# Patient Record
Sex: MALE | Race: WHITE | NOT HISPANIC OR LATINO | URBAN - METROPOLITAN AREA
[De-identification: names, ages, dates, MRNs, and addresses within clinical notes are randomized per-mention and may not be internally consistent; named-entity substitution may affect disease eponyms.]

---

## 2023-07-12 ENCOUNTER — TELEPHONE (OUTPATIENT)
Dept: PRIMARY CARE | Facility: CLINIC | Age: 68
End: 2023-07-12
Payer: MEDICARE

## 2023-07-12 DIAGNOSIS — K21.9 GASTROESOPHAGEAL REFLUX DISEASE WITHOUT ESOPHAGITIS: Primary | ICD-10-CM

## 2023-07-12 RX ORDER — OMEPRAZOLE 20 MG/1
20 CAPSULE, DELAYED RELEASE ORAL DAILY
Qty: 30 CAPSULE | Refills: 11 | Status: SHIPPED | OUTPATIENT
Start: 2023-07-12 | End: 2024-07-11

## 2023-07-12 NOTE — TELEPHONE ENCOUNTER
Patient called asking if you could send in an RX for Omeprozole 20mg he has been having a lot of acid reflux and got one a few off a friend and said they work great ,.

## 2023-07-31 ASSESSMENT — PROMIS GLOBAL HEALTH SCALE
RATE_GENERAL_HEALTH: VERY GOOD
CARRYOUT_SOCIAL_ACTIVITIES: VERY GOOD
RATE_MENTAL_HEALTH: VERY GOOD
CARRYOUT_PHYSICAL_ACTIVITIES: COMPLETELY
RATE_SOCIAL_SATISFACTION: VERY GOOD
EMOTIONAL_PROBLEMS: NEVER
RATE_AVERAGE_PAIN: 3
RATE_AVERAGE_FATIGUE: MILD
RATE_QUALITY_OF_LIFE: VERY GOOD
RATE_PHYSICAL_HEALTH: VERY GOOD

## 2023-08-02 ENCOUNTER — OFFICE VISIT (OUTPATIENT)
Dept: PRIMARY CARE | Facility: CLINIC | Age: 68
End: 2023-08-02
Payer: MEDICARE

## 2023-08-02 VITALS
WEIGHT: 180 LBS | RESPIRATION RATE: 16 BRPM | SYSTOLIC BLOOD PRESSURE: 150 MMHG | DIASTOLIC BLOOD PRESSURE: 80 MMHG | HEART RATE: 80 BPM | OXYGEN SATURATION: 97 % | HEIGHT: 72 IN | BODY MASS INDEX: 24.38 KG/M2 | TEMPERATURE: 98.2 F

## 2023-08-02 DIAGNOSIS — E78.00 HYPERCHOLESTEROLEMIA: ICD-10-CM

## 2023-08-02 DIAGNOSIS — M25.562 CHRONIC PAIN OF LEFT KNEE: Primary | ICD-10-CM

## 2023-08-02 DIAGNOSIS — Z87.891 FORMER SMOKER: ICD-10-CM

## 2023-08-02 DIAGNOSIS — G89.29 CHRONIC PAIN OF LEFT KNEE: Primary | ICD-10-CM

## 2023-08-02 DIAGNOSIS — Z79.899 CURRENT USE OF PROTON PUMP INHIBITOR: ICD-10-CM

## 2023-08-02 DIAGNOSIS — Z00.00 MEDICARE ANNUAL WELLNESS VISIT, INITIAL: ICD-10-CM

## 2023-08-02 DIAGNOSIS — Z12.5 SCREENING PSA (PROSTATE SPECIFIC ANTIGEN): ICD-10-CM

## 2023-08-02 DIAGNOSIS — I10 PRIMARY HYPERTENSION: ICD-10-CM

## 2023-08-02 PROBLEM — R94.31 ABNORMAL EKG: Status: RESOLVED | Noted: 2023-08-02 | Resolved: 2023-08-02

## 2023-08-02 PROBLEM — K21.9 GERD (GASTROESOPHAGEAL REFLUX DISEASE): Status: ACTIVE | Noted: 2023-08-02

## 2023-08-02 PROBLEM — D64.9 ANEMIA: Status: RESOLVED | Noted: 2023-08-02 | Resolved: 2023-08-02

## 2023-08-02 PROBLEM — M20.021 BOUTONNIERE DEFORMITY OF FINGER OF RIGHT HAND: Status: RESOLVED | Noted: 2023-08-02 | Resolved: 2023-08-02

## 2023-08-02 LAB
ALANINE AMINOTRANSFERASE (SGPT) (U/L) IN SER/PLAS: 95 U/L (ref 10–52)
ALBUMIN (G/DL) IN SER/PLAS: 4.4 G/DL (ref 3.4–5)
ALKALINE PHOSPHATASE (U/L) IN SER/PLAS: 47 U/L (ref 33–136)
ANION GAP IN SER/PLAS: 13 MMOL/L (ref 10–20)
ASPARTATE AMINOTRANSFERASE (SGOT) (U/L) IN SER/PLAS: 48 U/L (ref 9–39)
BILIRUBIN TOTAL (MG/DL) IN SER/PLAS: 0.8 MG/DL (ref 0–1.2)
CALCIUM (MG/DL) IN SER/PLAS: 9.6 MG/DL (ref 8.6–10.3)
CARBON DIOXIDE, TOTAL (MMOL/L) IN SER/PLAS: 28 MMOL/L (ref 21–32)
CHLORIDE (MMOL/L) IN SER/PLAS: 102 MMOL/L (ref 98–107)
CHOLESTEROL (MG/DL) IN SER/PLAS: 248 MG/DL (ref 0–199)
CHOLESTEROL IN HDL (MG/DL) IN SER/PLAS: 55.1 MG/DL
CHOLESTEROL/HDL RATIO: 4.5
CREATININE (MG/DL) IN SER/PLAS: 1.2 MG/DL (ref 0.5–1.3)
ERYTHROCYTE DISTRIBUTION WIDTH (RATIO) BY AUTOMATED COUNT: 12.8 % (ref 11.5–14.5)
ERYTHROCYTE MEAN CORPUSCULAR HEMOGLOBIN CONCENTRATION (G/DL) BY AUTOMATED: 32.8 G/DL (ref 32–36)
ERYTHROCYTE MEAN CORPUSCULAR VOLUME (FL) BY AUTOMATED COUNT: 91 FL (ref 80–100)
ERYTHROCYTES (10*6/UL) IN BLOOD BY AUTOMATED COUNT: 5.82 X10E12/L (ref 4.5–5.9)
GFR MALE: 66 ML/MIN/1.73M2
GLUCOSE (MG/DL) IN SER/PLAS: 103 MG/DL (ref 74–99)
HEMATOCRIT (%) IN BLOOD BY AUTOMATED COUNT: 52.7 % (ref 41–52)
HEMOGLOBIN (G/DL) IN BLOOD: 17.3 G/DL (ref 13.5–17.5)
LDL: 160 MG/DL (ref 0–99)
LEUKOCYTES (10*3/UL) IN BLOOD BY AUTOMATED COUNT: 5.3 X10E9/L (ref 4.4–11.3)
MAGNESIUM (MG/DL) IN SER/PLAS: 2.31 MG/DL (ref 1.6–2.4)
PLATELETS (10*3/UL) IN BLOOD AUTOMATED COUNT: 227 X10E9/L (ref 150–450)
POTASSIUM (MMOL/L) IN SER/PLAS: 4.9 MMOL/L (ref 3.5–5.3)
PROTEIN TOTAL: 6.6 G/DL (ref 6.4–8.2)
SODIUM (MMOL/L) IN SER/PLAS: 138 MMOL/L (ref 136–145)
TRIGLYCERIDE (MG/DL) IN SER/PLAS: 166 MG/DL (ref 0–149)
UREA NITROGEN (MG/DL) IN SER/PLAS: 14 MG/DL (ref 6–23)
VLDL: 33 MG/DL (ref 0–40)

## 2023-08-02 PROCEDURE — 3079F DIAST BP 80-89 MM HG: CPT | Performed by: FAMILY MEDICINE

## 2023-08-02 PROCEDURE — 1159F MED LIST DOCD IN RCRD: CPT | Performed by: FAMILY MEDICINE

## 2023-08-02 PROCEDURE — 3077F SYST BP >= 140 MM HG: CPT | Performed by: FAMILY MEDICINE

## 2023-08-02 PROCEDURE — 83735 ASSAY OF MAGNESIUM: CPT

## 2023-08-02 PROCEDURE — 85027 COMPLETE CBC AUTOMATED: CPT

## 2023-08-02 PROCEDURE — 80053 COMPREHEN METABOLIC PANEL: CPT

## 2023-08-02 PROCEDURE — 80061 LIPID PANEL: CPT

## 2023-08-02 PROCEDURE — G0103 PSA SCREENING: HCPCS

## 2023-08-02 PROCEDURE — 99214 OFFICE O/P EST MOD 30 MIN: CPT | Performed by: FAMILY MEDICINE

## 2023-08-02 PROCEDURE — 1160F RVW MEDS BY RX/DR IN RCRD: CPT | Performed by: FAMILY MEDICINE

## 2023-08-02 PROCEDURE — 1170F FXNL STATUS ASSESSED: CPT | Performed by: FAMILY MEDICINE

## 2023-08-02 PROCEDURE — 1036F TOBACCO NON-USER: CPT | Performed by: FAMILY MEDICINE

## 2023-08-02 PROCEDURE — 3008F BODY MASS INDEX DOCD: CPT | Performed by: FAMILY MEDICINE

## 2023-08-02 PROCEDURE — G0446 INTENS BEHAVE THER CARDIO DX: HCPCS | Performed by: FAMILY MEDICINE

## 2023-08-02 PROCEDURE — G0438 PPPS, INITIAL VISIT: HCPCS | Performed by: FAMILY MEDICINE

## 2023-08-02 RX ORDER — MELOXICAM 7.5 MG/1
1 TABLET ORAL DAILY
COMMUNITY
Start: 2022-08-05 | End: 2023-08-02

## 2023-08-02 RX ORDER — AMLODIPINE BESYLATE 10 MG/1
1 TABLET ORAL DAILY
COMMUNITY
Start: 2018-05-15 | End: 2023-11-13

## 2023-08-02 RX ORDER — MULTIVITAMIN
1 TABLET ORAL DAILY
COMMUNITY

## 2023-08-02 RX ORDER — ATORVASTATIN CALCIUM 40 MG/1
40 TABLET, FILM COATED ORAL DAILY
COMMUNITY
Start: 2016-04-22 | End: 2023-08-04 | Stop reason: ALTCHOICE

## 2023-08-02 RX ORDER — ASPIRIN 81 MG/1
81 TABLET ORAL DAILY
Qty: 30 TABLET | Refills: 11
Start: 2023-08-02 | End: 2024-08-01

## 2023-08-02 ASSESSMENT — ACTIVITIES OF DAILY LIVING (ADL)
GROCERY_SHOPPING: INDEPENDENT
DOING_HOUSEWORK: INDEPENDENT
BATHING: INDEPENDENT
DRESSING: INDEPENDENT
MANAGING_FINANCES: INDEPENDENT
TAKING_MEDICATION: INDEPENDENT

## 2023-08-02 ASSESSMENT — ENCOUNTER SYMPTOMS
LOSS OF SENSATION IN FEET: 0
DEPRESSION: 0
OCCASIONAL FEELINGS OF UNSTEADINESS: 0

## 2023-08-02 ASSESSMENT — PATIENT HEALTH QUESTIONNAIRE - PHQ9
1. LITTLE INTEREST OR PLEASURE IN DOING THINGS: NOT AT ALL
2. FEELING DOWN, DEPRESSED OR HOPELESS: NOT AT ALL
SUM OF ALL RESPONSES TO PHQ9 QUESTIONS 1 AND 2: 0

## 2023-08-02 NOTE — ASSESSMENT & PLAN NOTE
Stable on amlodipine to10 mg daily.   Continue healthy diet and regular exercise.  Well controlled at [place] blood donation.   To try cutting back on meloxicam to see if this helps bring it down.     Taking low dose aspirin to lower ASCVD risk.

## 2023-08-02 NOTE — PROGRESS NOTES
Subjective   Reason for Visit: Fer Kasper is an 68 y.o. male here for a Medicare Wellness visit.  And recheck on HTN,. High Cholesterol, and GERD.     Past Medical, Surgical, and Family History reviewed and updated in chart.    Reviewed all medications by prescribing practitioner or clinical pharmacist (such as prescriptions, OTCs, herbal therapies and supplements) and documented in the medical record.        Patient Care Team:  Watson Carmichael MD as PCP - General   Sees ORTHO Dr Agustin with [facility].         Objective   Vitals:  /80 (BP Location: Right arm, Patient Position: Sitting, BP Cuff Size: Adult)   Pulse 80   Temp 36.8 °C (98.2 °F) (Temporal)   Resp 16   Ht 1.829 m (6')   Wt 81.6 kg (180 lb)   SpO2 97%   BMI 24.41 kg/m²       Physical Exam  Vitals reviewed.   Constitutional:       General: He is not in acute distress.     Appearance: He is not ill-appearing.   HENT:      Head: Normocephalic and atraumatic.      Right Ear: Tympanic membrane normal.      Left Ear: Tympanic membrane normal.      Nose: No congestion or rhinorrhea.      Mouth/Throat:      Pharynx: No oropharyngeal exudate or posterior oropharyngeal erythema.   Eyes:      Extraocular Movements: Extraocular movements intact.      Conjunctiva/sclera: Conjunctivae normal.      Pupils: Pupils are equal, round, and reactive to light.   Cardiovascular:      Rate and Rhythm: Normal rate and regular rhythm.      Heart sounds: No murmur heard.     No friction rub. No gallop.   Pulmonary:      Effort: Pulmonary effort is normal.      Breath sounds: Normal breath sounds. No wheezing, rhonchi or rales.   Abdominal:      General: There is no distension.      Palpations: Abdomen is soft.      Tenderness: There is no abdominal tenderness. There is no guarding or rebound.   Musculoskeletal:         General: No swelling or deformity.      Cervical back: Normal range of motion and neck supple.      Right lower leg: No edema.      Left lower leg:  No edema.   Skin:     Capillary Refill: Capillary refill takes less than 2 seconds.      Coloration: Skin is not jaundiced.      Findings: No rash.   Neurological:      General: No focal deficit present.      Mental Status: He is alert.      Motor: No weakness.   Psychiatric:         Mood and Affect: Mood normal.         Behavior: Behavior normal.     Tight hamstrings.  No knee instability.     Assessment/Plan   Problem List Items Addressed This Visit       Chronic pain of left knee - Primary    Overview     S/p CSI (has not tried gel) injections but no longer working. (Ortho Agustin)  9/2023 PLANNING LEFT TKA (AGUSTIN).          Current Assessment & Plan     Also noting right knee giving out on him.  -- suspect hamstring tendonitis/bursitis -- stretching handout provided 8/2/23         Hypercholesterolemia    Overview     excellent control on atorvastatin 40 mg daily.          Current Assessment & Plan     Monitor labs. Continue atorvastatin.          Hypertension    Overview     NO lisinopril due to elevated cr.   Normal renal us 2018.            Current Assessment & Plan     Stable on amlodipine to10 mg daily.   Continue healthy diet and regular exercise.  Well controlled at [place] blood donation.   To try cutting back on meloxicam to see if this helps bring it down.     Taking low dose aspirin to lower ASCVD risk.          Medicare annual wellness visit, initial    Overview     8/2/23 Initial MEDICARE Wellness EVAL -- former smoker -- no current tobacco (quit > 30 y ago), no dementia, no depression. Colonoscopy clean 2014 (next due 2024). Tdap 2018. (next booster due 2028). Encouraged healthy diet and regular exercise. Works at [employer]. Moderate alcohol intake (2-3 glasses wine per night, more on weekend) -- will follow liver function. Pneumovax 8/12/2020. Vaccines up to date -- Prevnar 20 8/5/2022.  Last covid booster 10/11/2022.     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     I spent 15 minutes face-to-face with this individual discussing their cardiovascular risk and behavioral therapies of nutritional choices, exercise, and elimination of habits contributing to risk.  We agreed on a plan addressing reduction of their current cardiovascular risk.  Patient's 10 year CV risk estimate calculates to:   18% ()   4/28/14 Colonoscopy and EGD per Abbvaishali      # Hx Lumbar spondylosis/herniated disc -- improved with core strenghthening per DR Marie (PAIN 12/9/2020). Never needed prednisone burst.      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   # hx Abnormal EKG -- normal stress testing 4/26/2016. New LAFB with LAD 2022. Asymptomatic.   # Hx Chalazion -- left eyelid -- resolved.              Other Visit Diagnoses       BMI 24.0-24.9, adult        Former smoker

## 2023-08-02 NOTE — ASSESSMENT & PLAN NOTE
Also noting right knee giving out on him.  -- suspect hamstring tendonitis/bursitis -- stretching handout provided 8/2/23

## 2023-08-02 NOTE — ASSESSMENT & PLAN NOTE
>>ASSESSMENT AND PLAN FOR CHRONIC PAIN OF LEFT KNEE WRITTEN ON 8/2/2023  9:32 AM BY HERMILO BERTRAND MD    Also noting right knee giving out on him.  -- suspect hamstring tendonitis/bursitis -- stretching handout provided 8/2/23

## 2023-08-03 LAB — PROSTATE SPECIFIC AG (NG/ML) IN SER/PLAS: 3.84 NG/ML (ref 0–4)

## 2023-08-04 DIAGNOSIS — E78.00 HYPERCHOLESTEROLEMIA: Primary | ICD-10-CM

## 2023-08-04 DIAGNOSIS — E78.00 HYPERCHOLESTEROLEMIA: ICD-10-CM

## 2023-08-04 RX ORDER — ROSUVASTATIN CALCIUM 20 MG/1
20 TABLET, COATED ORAL DAILY
Qty: 90 TABLET | Refills: 3 | Status: SHIPPED
Start: 2023-08-04 | End: 2023-11-13 | Stop reason: ALTCHOICE

## 2023-08-04 RX ORDER — ROSUVASTATIN CALCIUM 20 MG/1
20 TABLET, COATED ORAL DAILY
Qty: 90 TABLET | Refills: 3 | Status: SHIPPED | OUTPATIENT
Start: 2023-08-04 | End: 2024-08-03

## 2023-10-02 ENCOUNTER — HOSPITAL ENCOUNTER (OUTPATIENT)
Facility: HOSPITAL | Age: 68
Setting detail: OUTPATIENT SURGERY
End: 2023-10-02
Attending: ORTHOPAEDIC SURGERY | Admitting: ORTHOPAEDIC SURGERY
Payer: MEDICARE

## 2023-11-10 ENCOUNTER — APPOINTMENT (OUTPATIENT)
Dept: PRIMARY CARE | Facility: CLINIC | Age: 68
End: 2023-11-10
Payer: MEDICARE

## 2023-11-11 DIAGNOSIS — I10 PRIMARY HYPERTENSION: Primary | ICD-10-CM

## 2023-11-13 RX ORDER — MELOXICAM 7.5 MG/1
TABLET ORAL
COMMUNITY
Start: 2023-08-17 | End: 2023-11-14 | Stop reason: ALTCHOICE

## 2023-11-13 RX ORDER — AMLODIPINE BESYLATE 10 MG/1
10 TABLET ORAL DAILY
Qty: 90 TABLET | Refills: 3 | Status: SHIPPED | OUTPATIENT
Start: 2023-11-13

## 2023-11-14 ENCOUNTER — OFFICE VISIT (OUTPATIENT)
Dept: PRIMARY CARE | Facility: CLINIC | Age: 68
End: 2023-11-14
Payer: MEDICARE

## 2023-11-14 VITALS
DIASTOLIC BLOOD PRESSURE: 80 MMHG | HEART RATE: 102 BPM | RESPIRATION RATE: 16 BRPM | WEIGHT: 185.2 LBS | HEIGHT: 72 IN | TEMPERATURE: 97.8 F | OXYGEN SATURATION: 98 % | BODY MASS INDEX: 25.09 KG/M2 | SYSTOLIC BLOOD PRESSURE: 140 MMHG

## 2023-11-14 DIAGNOSIS — K21.9 GASTROESOPHAGEAL REFLUX DISEASE WITHOUT ESOPHAGITIS: ICD-10-CM

## 2023-11-14 DIAGNOSIS — G89.29 CHRONIC PAIN OF LEFT KNEE: Primary | ICD-10-CM

## 2023-11-14 DIAGNOSIS — E66.3 OVERWEIGHT WITH BODY MASS INDEX (BMI) OF 25 TO 25.9 IN ADULT: ICD-10-CM

## 2023-11-14 DIAGNOSIS — Z87.891 FORMER SMOKER: ICD-10-CM

## 2023-11-14 DIAGNOSIS — M25.562 CHRONIC PAIN OF LEFT KNEE: Primary | ICD-10-CM

## 2023-11-14 DIAGNOSIS — E78.00 HYPERCHOLESTEROLEMIA: ICD-10-CM

## 2023-11-14 DIAGNOSIS — I10 PRIMARY HYPERTENSION: ICD-10-CM

## 2023-11-14 PROCEDURE — 3008F BODY MASS INDEX DOCD: CPT | Performed by: FAMILY MEDICINE

## 2023-11-14 PROCEDURE — 99214 OFFICE O/P EST MOD 30 MIN: CPT | Performed by: FAMILY MEDICINE

## 2023-11-14 PROCEDURE — 3077F SYST BP >= 140 MM HG: CPT | Performed by: FAMILY MEDICINE

## 2023-11-14 PROCEDURE — 1159F MED LIST DOCD IN RCRD: CPT | Performed by: FAMILY MEDICINE

## 2023-11-14 PROCEDURE — 1160F RVW MEDS BY RX/DR IN RCRD: CPT | Performed by: FAMILY MEDICINE

## 2023-11-14 PROCEDURE — 1036F TOBACCO NON-USER: CPT | Performed by: FAMILY MEDICINE

## 2023-11-14 PROCEDURE — 3079F DIAST BP 80-89 MM HG: CPT | Performed by: FAMILY MEDICINE

## 2023-11-14 ASSESSMENT — ENCOUNTER SYMPTOMS
SHORTNESS OF BREATH: 0
ARTHRALGIAS: 1
COUGH: 0
HEMATURIA: 0
BRUISES/BLEEDS EASILY: 0
HALLUCINATIONS: 0
SORE THROAT: 0
POLYDIPSIA: 0
BACK PAIN: 0
WOUND: 0
DYSURIA: 0
HEADACHES: 0
CHILLS: 0
FATIGUE: 0
EYE REDNESS: 0
BLOOD IN STOOL: 0
NECK STIFFNESS: 0
FEVER: 0
RHINORRHEA: 0
PALPITATIONS: 0
EYE PAIN: 0
ABDOMINAL PAIN: 0
ADENOPATHY: 0
WEAKNESS: 0

## 2023-11-14 NOTE — ASSESSMENT & PLAN NOTE
Stable on amlodipine to10 mg daily.     Continue current medications, healthy diet and regular exercise.    Well controlled at [place] blood donation.     Should take amlodipine on day of surgery.  OK to hold low dose aspirin for 1 week prior to surgery.  Then restart the day after.    Taking low dose aspirin to lower ASCVD risk.

## 2023-11-14 NOTE — ASSESSMENT & PLAN NOTE
>>ASSESSMENT AND PLAN FOR CHRONIC PAIN OF LEFT KNEE WRITTEN ON 11/14/2023  1:26 PM BY HERMILO BERTRAND MD    CLEAR FOR UPCOMING SURGERY 12/11/23  Low cardiac risk for upcoming moderate risk orthopedic surgery.   STOP ASPIRIN for 1 week prior to surgery. Restart the day after.   Take BP medicine amlodipine on day of surgery.     Best of [place]!!!

## 2023-11-14 NOTE — PROGRESS NOTES
Fer Kasper is a 68 y.o. male with Chief Complaint of Pre-op Exam (LT total knee replacement ).    Seen today for comprensive evaluation of chronic HTN, HLD, GERD prior to undergoing TKA for end stage OA that has not responded to conservative measures.     No cp/sob/n/v/d.     Past Surgical History:   Procedure Laterality Date    COLONOSCOPY  04/29/2015    Complete Colonoscopy    ESOPHAGOGASTRODUODENOSCOPY  04/29/2015    Diagnostic Esophagogastroduodenoscopy      Social History     Socioeconomic History    Marital status:      Spouse name: Not on file    Number of children: Not on file    Years of education: Not on file    Highest education level: Not on file   Occupational History    Not on file   Tobacco Use    Smoking status: Former     Types: Cigarettes     Passive exposure: Past    Smokeless tobacco: Never   Vaping Use    Vaping Use: Never used   Substance and Sexual Activity    Alcohol use: Yes     Alcohol/week: 6.0 standard drinks of alcohol     Types: 6 Glasses of wine per week    Drug use: Never    Sexual activity: Yes     Partners: Female   Other Topics Concern    Not on file   Social History Narrative    Not on file     Social Determinants of Health     Financial Resource Strain: Not on file   Food Insecurity: Not on file   Transportation Needs: Not on file   Physical Activity: Not on file   Stress: Not on file   Social Connections: Not on file   Intimate Partner Violence: Not on file   Housing Stability: Not on file     Past Medical History:   Diagnosis Date    Abnormal EKG 08/02/2023    Anemia 08/02/2023    Boutonniere deformity of finger of right hand 08/02/2023    Brachial neuritis or radiculitis 12/09/2010    Cervicalgia 12/09/2010    Other long term (current) drug therapy 08/10/2016    Current use of proton pump inhibitor    Other long term (current) drug therapy 08/10/2016    Use of proton pump inhibitor therapy    Pain in joint, shoulder region 12/09/2010    Personal history of other  specified conditions 02/27/2014    History of abdominal pain      No family history on file.     Review of Systems   Constitutional:  Negative for chills, fatigue and fever.   HENT:  Negative for rhinorrhea and sore throat.    Eyes:  Negative for pain and redness.   Respiratory:  Negative for cough and shortness of breath.    Cardiovascular:  Negative for chest pain and palpitations.   Gastrointestinal:  Negative for abdominal pain and blood in stool.   Endocrine: Negative for polydipsia and polyuria.   Genitourinary:  Negative for dysuria and hematuria.   Musculoskeletal:  Positive for arthralgias. Negative for back pain and neck stiffness.   Skin:  Negative for rash and wound.   Allergic/Immunologic: Negative for environmental allergies and food allergies.   Neurological:  Negative for weakness and headaches.   Hematological:  Negative for adenopathy. Does not bruise/bleed easily.   Psychiatric/Behavioral:  Negative for hallucinations and suicidal ideas.       /80 (BP Location: Right arm, Patient Position: Sitting, BP Cuff Size: Adult)   Pulse 102   Temp 36.6 °C (97.8 °F) (Temporal)   Resp 16   Ht 1.829 m (6')   Wt 84 kg (185 lb 3.2 oz)   SpO2 98%   BMI 25.12 kg/m²   Physical Exam  Vitals reviewed.   Constitutional:       General: He is not in acute distress.     Appearance: He is not ill-appearing.   HENT:      Head: Normocephalic and atraumatic.      Right Ear: Tympanic membrane normal.      Left Ear: Tympanic membrane normal.      Nose: No congestion or rhinorrhea.      Mouth/Throat:      Pharynx: No oropharyngeal exudate or posterior oropharyngeal erythema.   Eyes:      Extraocular Movements: Extraocular movements intact.      Conjunctiva/sclera: Conjunctivae normal.      Pupils: Pupils are equal, round, and reactive to light.   Cardiovascular:      Rate and Rhythm: Normal rate and regular rhythm.      Heart sounds: No murmur heard.     No friction rub. No gallop.   Pulmonary:      Effort:  "Pulmonary effort is normal.      Breath sounds: Normal breath sounds. No wheezing, rhonchi or rales.   Abdominal:      General: There is no distension.      Palpations: Abdomen is soft.      Tenderness: There is no abdominal tenderness. There is no guarding or rebound.   Musculoskeletal:         General: No swelling or deformity.      Cervical back: Normal range of motion and neck supple.      Right lower leg: No edema.      Left lower leg: No edema.   Skin:     Capillary Refill: Capillary refill takes less than 2 seconds.      Coloration: Skin is not jaundiced.      Findings: No rash.   Neurological:      General: No focal deficit present.      Mental Status: He is alert.      Motor: No weakness.   Psychiatric:         Mood and Affect: Mood normal.         Behavior: Behavior normal.       Lab Results   Component Value Date    WBC 5.3 08/02/2023    HGB 17.3 08/02/2023    HCT 52.7 (H) 08/02/2023    MCV 91 08/02/2023     08/02/2023     Lab Results   Component Value Date    CHOL 248 (H) 08/02/2023    CHOL 186 08/05/2022    CHOL 177 08/20/2021     Lab Results   Component Value Date    HDL 55.1 08/02/2023    HDL 78.8 08/05/2022    HDL 91.9 08/20/2021     No results found for: \"LDLCALC\"  Lab Results   Component Value Date    TRIG 166 (H) 08/02/2023    TRIG 39 08/05/2022    TRIG 28 (LL) 08/20/2021     No components found for: \"CHOLHDL\"  No results found for: \"HGBA1C\"    Assessment/Plan   Problem List Items Addressed This Visit       Chronic pain of left knee - Primary    Overview     S/p CSI (has not tried gel) injections but no longer working. (Ortho Agustin)    12/11/23 PLANNING LEFT TKA (AGUSTIN).          Current Assessment & Plan     CLEAR FOR UPCOMING SURGERY 12/11/23  Low cardiac risk for upcoming moderate risk orthopedic surgery.   STOP ASPIRIN for 1 week prior to surgery. Restart the day after.   Take BP medicine amlodipine on day of surgery.     [facility] of [place]!!!         GERD (gastroesophageal reflux disease)    " Overview     EGD 4/28/14 -- gastric erythema.  Restarted PPI -- omeprazole 20 mg daily.   under good control with apple cider vinegar capsules.         Current Assessment & Plan     Continue omeprazole to good effect.   Mg level normal.          Hypercholesterolemia    Overview     8/4/23 Changed atorva 40 to rosuva 20 mg.          Current Assessment & Plan     Continue atorvastatin.          Hypertension    Overview     NO lisinopril/ACE/ARB due to elevated cr.     Normal renal US 2018.            Current Assessment & Plan     Stable on amlodipine to10 mg daily.     Continue current medications, healthy diet and regular exercise.    Well controlled at [place] blood donation.     Should take amlodipine on day of surgery.  OK to hold low dose aspirin for 1 week prior to surgery.  Then restart the day after.    Taking low dose aspirin to lower ASCVD risk.           Other Visit Diagnoses       Overweight with body mass index (BMI) of 25 to 25.9 in adult        Former smoker

## 2023-11-14 NOTE — ASSESSMENT & PLAN NOTE
CLEAR FOR UPCOMING SURGERY 12/11/23  Low cardiac risk for upcoming moderate risk orthopedic surgery.   STOP ASPIRIN for 1 week prior to surgery. Restart the day after.   Take BP medicine amlodipine on day of surgery.     Best of [place]!!!

## 2023-12-01 ENCOUNTER — PRE-ADMISSION TESTING (OUTPATIENT)
Dept: PREADMISSION TESTING | Facility: HOSPITAL | Age: 68
End: 2023-12-01
Payer: MEDICARE

## 2023-12-01 ENCOUNTER — LAB (OUTPATIENT)
Dept: LAB | Facility: LAB | Age: 68
End: 2023-12-01
Payer: MEDICARE

## 2023-12-01 VITALS
HEIGHT: 72 IN | TEMPERATURE: 96.8 F | SYSTOLIC BLOOD PRESSURE: 155 MMHG | OXYGEN SATURATION: 97 % | HEART RATE: 116 BPM | WEIGHT: 182.54 LBS | DIASTOLIC BLOOD PRESSURE: 81 MMHG | BODY MASS INDEX: 24.72 KG/M2

## 2023-12-01 DIAGNOSIS — Z01.818 PREOPERATIVE TESTING: ICD-10-CM

## 2023-12-01 DIAGNOSIS — Z01.818 PREOPERATIVE TESTING: Primary | ICD-10-CM

## 2023-12-01 LAB
ALBUMIN SERPL-MCNC: 4.6 G/DL (ref 3.5–5)
ALP BLD-CCNC: 74 U/L (ref 35–125)
ALT SERPL-CCNC: 28 U/L (ref 5–40)
ANION GAP SERPL CALC-SCNC: 16 MMOL/L
APPEARANCE UR: ABNORMAL
AST SERPL-CCNC: 43 U/L (ref 5–40)
BILIRUB SERPL-MCNC: 0.9 MG/DL (ref 0.1–1.2)
BILIRUB UR STRIP.AUTO-MCNC: NEGATIVE MG/DL
BUN SERPL-MCNC: 27 MG/DL (ref 8–25)
CALCIUM SERPL-MCNC: 9.5 MG/DL (ref 8.5–10.4)
CHLORIDE SERPL-SCNC: 99 MMOL/L (ref 97–107)
CO2 SERPL-SCNC: 22 MMOL/L (ref 24–31)
COLOR UR: YELLOW
CREAT SERPL-MCNC: 1.4 MG/DL (ref 0.4–1.6)
ERYTHROCYTE [DISTWIDTH] IN BLOOD BY AUTOMATED COUNT: 12.3 % (ref 11.5–14.5)
GFR SERPL CREATININE-BSD FRML MDRD: 55 ML/MIN/1.73M*2
GLUCOSE SERPL-MCNC: 93 MG/DL (ref 65–99)
GLUCOSE UR STRIP.AUTO-MCNC: NORMAL MG/DL
HCT VFR BLD AUTO: 42.2 % (ref 41–52)
HGB BLD-MCNC: 14 G/DL (ref 13.5–17.5)
KETONES UR STRIP.AUTO-MCNC: ABNORMAL MG/DL
LEUKOCYTE ESTERASE UR QL STRIP.AUTO: NEGATIVE
MCH RBC QN AUTO: 31.2 PG (ref 26–34)
MCHC RBC AUTO-ENTMCNC: 33.2 G/DL (ref 32–36)
MCV RBC AUTO: 94 FL (ref 80–100)
MUCOUS THREADS #/AREA URNS AUTO: NORMAL /LPF
NITRITE UR QL STRIP.AUTO: NEGATIVE
NRBC BLD-RTO: 0 /100 WBCS (ref 0–0)
PH UR STRIP.AUTO: 5.5 [PH]
PLATELET # BLD AUTO: 241 X10*3/UL (ref 150–450)
POTASSIUM SERPL-SCNC: 5.1 MMOL/L (ref 3.4–5.1)
PROT SERPL-MCNC: 7.4 G/DL (ref 5.9–7.9)
PROT UR STRIP.AUTO-MCNC: ABNORMAL MG/DL
RBC # BLD AUTO: 4.49 X10*6/UL (ref 4.5–5.9)
RBC # UR STRIP.AUTO: NEGATIVE /UL
RBC #/AREA URNS AUTO: NORMAL /HPF
SODIUM SERPL-SCNC: 137 MMOL/L (ref 133–145)
SP GR UR STRIP.AUTO: 1.03
UROBILINOGEN UR STRIP.AUTO-MCNC: NORMAL MG/DL
WBC # BLD AUTO: 4.7 X10*3/UL (ref 4.4–11.3)
WBC #/AREA URNS AUTO: NORMAL /HPF

## 2023-12-01 PROCEDURE — 36415 COLL VENOUS BLD VENIPUNCTURE: CPT

## 2023-12-01 PROCEDURE — 81001 URINALYSIS AUTO W/SCOPE: CPT

## 2023-12-01 PROCEDURE — 93005 ELECTROCARDIOGRAM TRACING: CPT

## 2023-12-01 PROCEDURE — 85027 COMPLETE CBC AUTOMATED: CPT

## 2023-12-01 PROCEDURE — 93010 ELECTROCARDIOGRAM REPORT: CPT | Performed by: INTERNAL MEDICINE

## 2023-12-01 PROCEDURE — 87081 CULTURE SCREEN ONLY: CPT | Mod: WESLAB

## 2023-12-01 PROCEDURE — 80053 COMPREHEN METABOLIC PANEL: CPT

## 2023-12-01 RX ORDER — IBUPROFEN 200 MG
400 TABLET ORAL EVERY 6 HOURS PRN
COMMUNITY
End: 2023-12-20 | Stop reason: ALTCHOICE

## 2023-12-01 RX ORDER — BISMUTH SUBSALICYLATE 262 MG/1
524 TABLET ORAL DAILY PRN
COMMUNITY
End: 2023-12-20 | Stop reason: ALTCHOICE

## 2023-12-01 RX ORDER — CHLORHEXIDINE GLUCONATE ORAL RINSE 1.2 MG/ML
15 SOLUTION DENTAL DAILY
Qty: 30 ML | Refills: 0 | Status: SHIPPED | OUTPATIENT
Start: 2023-12-01 | End: 2023-12-03

## 2023-12-01 ASSESSMENT — DUKE ACTIVITY SCORE INDEX (DASI)
CAN YOU DO MODERATE WORK AROUND THE HOUSE LIKE VACUUMING, SWEEPING FLOORS OR CARRYING GROCERIES: YES
CAN YOU DO YARD WORK LIKE RAKING LEAVES, WEEDING OR PUSHING A MOWER: YES
DASI METS SCORE: 9
CAN YOU TAKE CARE OF YOURSELF (EAT, DRESS, BATHE, OR USE TOILET): YES
CAN YOU PARTICIPATE IN MODERATE RECREATIONAL ACTIVITIES LIKE GOLF, BOWLING, DANCING, DOUBLES TENNIS OR THROWING A BASEBALL OR FOOTBALL: YES
CAN YOU HAVE SEXUAL RELATIONS: YES
CAN YOU WALK A BLOCK OR TWO ON LEVEL GROUND: YES
CAN YOU CLIMB A FLIGHT OF STAIRS OR WALK UP A HILL: YES
CAN YOU WALK INDOORS, SUCH AS AROUND YOUR HOUSE: YES
CAN YOU DO LIGHT WORK AROUND THE HOUSE LIKE DUSTING OR WASHING DISHES: YES
CAN YOU PARTICIPATE IN STRENOUS SPORTS LIKE SWIMMING, SINGLES TENNIS, FOOTBALL, BASKETBALL, OR SKIING: NO
CAN YOU DO HEAVY WORK AROUND THE HOUSE LIKE SCRUBBING FLOORS OR LIFTING AND MOVING HEAVY FURNITURE: YES
TOTAL_SCORE: 50.7
CAN YOU RUN A SHORT DISTANCE: YES

## 2023-12-01 ASSESSMENT — PAIN SCALES - GENERAL: PAINLEVEL_OUTOF10: 6

## 2023-12-01 ASSESSMENT — PAIN DESCRIPTION - DESCRIPTORS: DESCRIPTORS: ACHING;SPASM

## 2023-12-01 ASSESSMENT — PAIN - FUNCTIONAL ASSESSMENT
PAIN_FUNCTIONAL_ASSESSMENT: 0-10
PAIN_FUNCTIONAL_ASSESSMENT: 0-10

## 2023-12-01 NOTE — PREPROCEDURE INSTRUCTIONS
Medication List            Accurate as of December 1, 2023 10:39 AM. Always use your most recent med list.                amLODIPine 10 mg tablet  Commonly known as: Norvasc  TAKE ONE TABLET BY MOUTH DAILY  Medication Adjustments for Surgery: Take morning of surgery with sip of water, no other fluids     aspirin 81 mg EC tablet  Take 1 tablet (81 mg) by mouth once daily.  Medication Adjustments for Surgery: Stop 7 days before surgery     bismuth subsalicylate 262 mg chewable tablet  Commonly known as: Pepto Bismol  Medication Adjustments for Surgery: Take morning of surgery with sip of water, no other fluids     chlorhexidine 0.12 % solution  Commonly known as: Peridex  Use 15 mL in the mouth or throat once daily for 2 doses. 15 ML night before surgery and 15 ML morning of surgery. Swish and spit  Notes to patient:  AT YOUR PHARMACY     ibuprofen 200 mg tablet  Medication Adjustments for Surgery: Stop 7 days before surgery     multivitamin tablet  Medication Adjustments for Surgery: Stop 7 days before surgery     omeprazole 20 mg DR capsule  Commonly known as: PriLOSEC  Take 1 capsule (20 mg) by mouth once daily. Do not crush or chew.  Medication Adjustments for Surgery: Take morning of surgery with sip of water, no other fluids     rosuvastatin 20 mg tablet  Commonly known as: Crestor  Take 1 tablet (20 mg) by mouth once daily.  Medication Adjustments for Surgery: Continue until night before surgery            JOINT REPLACEMENT BOOK PROVIDED              NPO Instructions:    Do not eat any food after midnight the night before your surgery/procedure.  You may have clear liquids until TWO hours before surgery/procedure. This includes water, black tea/coffee, (no milk or cream) apple juice and electrolyte drinks (Gatorade).    Additional Instructions:     Review your medication instructions, stop indicated medications  Begin using your Hibiclens  Day of Surgery:  Review your medication instructions, take  indicated medications  You may have clear liquids until TWO hours before surgery/procedure.  This includes water, black tea/coffee, (no milk or cream) apple juice and electrolyte drinks (Gatorade)  Wear  comfortable loose fitting clothing  Do not use moisturizers, creams, lotions or perfume  All jewelry and valuables should be left at home   Home Preoperative Antibacterial Shower    What is a home preoperative antibacterial shower?  This shower is a way of cleaning the skin with a germ killing solution before surgery. The solution contains chlorhexidine, commonly known as CHG. CHG is a skin cleanser with germ killing ability. Let your doctor know if you are allergic to chlorhexidine.      Why do I need to take a preoperative antibacterial shower?  Skin is not sterile. It is best to try to make your skin as free of germs as possible before surgery. Proper cleansing with a germ killing soap before surgery can lower the number of germs on your skin. This helps to reduce the risk of infection at the surgical site. Following the instructions listed below will help you prepare your skin for surgery.    How do I use the solution?      Steps: Begin using your CHG soap ON DECEMBER 7 for your scheduled surgery _______________________________________  First, wash and rinse your hair using the CHG soap. Keep CHG away soap away from ear canals and eyes.  Rinse completely, do not condition. Hair extensions should be removed.  Wash your face with your normal soap and rinse.  Apply the CHG solution to a clean wet washcloth. Turn the water off or move away from the water spray to avoid premature rinsing of the CHG soap as you are applying.  Firmly lather your entire body from neck down. Do not use on face.  Pay special attention to the areas(s) where your incision(s) will be located unless they are on your face.  Avoid scrubbing your skin too hard.  The important point is to have the CHG soap sit on your skin for 3 minutes.  DO NOT  wash with regular soap after you have used the CHG soap solution.  Pat yourself dry with a clean, freshly laundered towel.  DO NOT apply powders, deodorants or lotions.  Dress in clean, freshly laundered night clothes.  Be sure to sleep with clean, freshly laundered sheets.  Be aware that CHG will cause stains on fabrics; if you wash them with bleach after use. Rinse your washcloth and other linens that have contact with CHG completely. Use only non-chlorine detergents to launder the items used.  The morning of surgery is the fifth day. Repeat the above steps and dress in clean comfortable clothing.      Who should I call if I have any questions regarding the use of CHG soap?  Call the [facility] at [phone] if you have any questions.   Patient Information: Oral/Dental Rinse    What is oral/dental rinse?  It is a mouthwash. It is a way of cleaning the mouth with a germ killing solution before your surgery. The solution contains chlorhexidine, commonly know as CHG.  It is used inside the mouth to kill bacteria known as Staphylococcus aureus.  Let your doctor know if you are allergic to chlorhexidine.    Why do I need to use CHG oral/dental rinse?  The CHG oral/dental rinse helps to kill bacteria in your mouth known as Staphylococcus aureus. This reduces the risk of infection at the surgical site.    Using your CHG oral/dental rinse.  STEPS: use your CHG oral/dental rinse after you brush your teeth the night before (at bedtime) and the morning of your surgery. Follow the directions on your prescription label.  Use the cap on the container to measure 15ml (fill cap to fill line)  Swish (gargle if you can) the mouthwash in your mouth for at least 30 seconds, (do not swallow) spit out.  After you use your CHG rinse, do not rinse your mouth with water, drink or eat. Please refer to prescription label for the appropriate time to resume oral  intake.    What side effects might I have using the CHG oral/dental rinse?  CHG rinse will stick to the plaque on the teeth. Brush and floss just before use. Teeth brushing will help avoid staining of plaque during use.    Who should I contact if I have questions about the CHG oral/dental rinse?  Please call [facility], [facility] at [phone] if you have any questions.  PAT DISCHARGE INSTRUCTIONS    Please call the Same Day Surgery (SDS) Department of the hospital where your procedure will be performed after 2:00 PM the day before your surgery. If you are scheduled on a Monday, or a Tuesday following a Monday holiday, you will need to call on the last business day prior to your surgery.    [facility]  [id number] [place] [address], [address] [phone]    [facility]  [address] [place] [address] [phone]    [address] [place] [address]  [phone]    Please let your surgeon know if:      You develop any open sores, shingles, burning or painful urination as these may increase your risk of an infection.   You no longer wish to have the surgery.   Any other personal circumstances change that may lead to the need to cancel or defer this surgery-such as being sick or getting admitted to any hospital within one week of your planned procedure.    Your contact details change, such as a change of address or phone number.    Starting now:     Please DO NOT drink alcohol or smoke for 24 hours before surgery. It is well known that quitting smoking can make a huge difference to your health and recovery from surgery. The longer you abstain from smoking, the better your chances of a healthy recovery. If you need help with quitting, call 5-964-QUIT-NOW to be connected to a trained counselor who will discuss the best methods to help you quit.     Before your surgery:    Please stop all  supplements 7 days prior to surgery. Or as directed by your surgeon.   Please stop taking NSAID pain medicine such as Advil and Motrin 7 days before surgery.    If you develop any fever, cough, cold, rashes, cuts, scratches, scrapes, urinary symptoms or infection anywhere on your body (including teeth and gums) prior to surgery, please call your surgeon’s office as soon as possible. This may require treatment to reduce the chance of cancellation on the day of surgery.    The day before your surgery:   DIET- Do not eat any food after MIDNIGHT. May have 10 ounces of CLEAR LIQUIDS until TWO HOURS before your arrival time. This includes water, black tea or coffee (no milk ir cream), apple juice and electrolyte drinks (Gatorade). May chew gum until TWO hours before your surgery time.   Get a good night’s rest.  Use the special soap for bathing if you have been instructed to use one.    Scheduled surgery times may change and you will be notified if this occurs - please check your personal voicemail for any updates.     On the morning of surgery:   Wear comfortable, loose fitting clothes which open in the front. Please do not wear moisturizers, creams, lotions, makeup or perfume.    Please bring with you to surgery:   Photo ID and insurance card   Current list of medicines and allergies   Pacemaker/ Defibrillator/Heart stent cards   CPAP machine and mask    Slings/ splints/ crutches   A copy of your complete advanced directive/DHPOA.    Please do NOT bring with you to surgery:   All jewelry and valuables should be left at home.   Prosthetic devices such as contact lenses, hearing aids, dentures, eyelash extensions, hairpins and body piercings must be removed prior to going in to the surgical suite.    After outpatient surgery:   A responsible adult MUST accompany you at the time of discharge and stay with you for 24 hours after your surgery. You may NOT drive yourself home after surgery.    Do not drive, operate machinery,  make critical decisions or do activities that require co-ordination or balance until after a night’s sleep.   Do not drink alcoholic beverages for 24 hours.   Instructions for resuming your medications will be provided by your surgeon.    CALL YOUR DOCTOR AFTER SURGERY IF YOU HAVE:     Chills and/or a fever of 101° F or higher.    Redness, swelling, pus or drainage from your surgical wound or a bad smell from the wound.    Lightheadedness, fainting or confusion.    Persistent vomiting (throwing up) and are not able to eat or drink for 12 hours.    Three or more loose, watery bowel movements in 24 hours (diarrhea).   Difficulty or pain while urinating( after non-urological surgery)    Pain and swelling in your legs, especially if it is only on one side.    Difficulty breathing or are breathing faster than normal.    Any new concerning symptoms.

## 2023-12-03 LAB — STAPHYLOCOCCUS SPEC CULT: ABNORMAL

## 2023-12-07 PROBLEM — M17.12 ARTHRITIS OF LEFT KNEE: Status: ACTIVE | Noted: 2023-12-07

## 2023-12-07 LAB
ATRIAL RATE: 80 BPM
P OFFSET: 190 MS
P ONSET: 132 MS
PR INTERVAL: 188 MS
Q ONSET: 226 MS
QRS COUNT: 13 BEATS
QRS DURATION: 142 MS
QT INTERVAL: 414 MS
QTC CALCULATION(BAZETT): 477 MS
QTC FREDERICIA: 456 MS
R AXIS: -78 DEGREES
T AXIS: 18 DEGREES
T OFFSET: 433 MS
VENTRICULAR RATE: 80 BPM

## 2023-12-07 RX ORDER — SODIUM CHLORIDE, SODIUM LACTATE, POTASSIUM CHLORIDE, CALCIUM CHLORIDE 600; 310; 30; 20 MG/100ML; MG/100ML; MG/100ML; MG/100ML
100 INJECTION, SOLUTION INTRAVENOUS CONTINUOUS
Status: CANCELLED | OUTPATIENT
Start: 2023-12-07

## 2023-12-07 RX ORDER — CEFAZOLIN SODIUM 2 G/100ML
2 INJECTION, SOLUTION INTRAVENOUS ONCE
Status: CANCELLED | OUTPATIENT
Start: 2023-12-07 | End: 2023-12-07

## 2023-12-07 RX ORDER — ACETAMINOPHEN 325 MG/1
650 TABLET ORAL ONCE
Status: CANCELLED | OUTPATIENT
Start: 2023-12-07 | End: 2023-12-07

## 2023-12-07 RX ORDER — PREGABALIN 75 MG/1
75 CAPSULE ORAL ONCE
Status: CANCELLED | OUTPATIENT
Start: 2023-12-07 | End: 2023-12-07

## 2023-12-07 RX ORDER — CELECOXIB 200 MG/1
400 CAPSULE ORAL ONCE
Status: CANCELLED | OUTPATIENT
Start: 2023-12-07 | End: 2023-12-07

## 2023-12-07 RX ORDER — OXYCODONE HCL 10 MG/1
20 TABLET, FILM COATED, EXTENDED RELEASE ORAL ONCE
Status: CANCELLED | OUTPATIENT
Start: 2023-12-07 | End: 2023-12-07

## 2023-12-07 NOTE — PERIOPERATIVE NURSING NOTE
Dr. Landeros anesthesia consulted and concurred patient will need cardiac clearance.  Dr. Agustin's office called and notified on Friday December 1.

## 2023-12-20 ENCOUNTER — PRE-ADMISSION TESTING (OUTPATIENT)
Dept: PREADMISSION TESTING | Facility: HOSPITAL | Age: 68
End: 2023-12-20
Payer: MEDICARE

## 2023-12-20 VITALS
DIASTOLIC BLOOD PRESSURE: 86 MMHG | HEIGHT: 72 IN | SYSTOLIC BLOOD PRESSURE: 156 MMHG | WEIGHT: 187.61 LBS | TEMPERATURE: 96.8 F | HEART RATE: 85 BPM | OXYGEN SATURATION: 99 % | BODY MASS INDEX: 25.41 KG/M2

## 2023-12-20 DIAGNOSIS — M17.12 OSTEOARTHRITIS OF LEFT KNEE, UNSPECIFIED OSTEOARTHRITIS TYPE: Primary | ICD-10-CM

## 2023-12-20 PROCEDURE — 99204 OFFICE O/P NEW MOD 45 MIN: CPT | Performed by: PHYSICIAN ASSISTANT

## 2023-12-20 PROCEDURE — 94760 N-INVAS EAR/PLS OXIMETRY 1: CPT

## 2023-12-20 RX ORDER — VALSARTAN 160 MG/1
160 TABLET ORAL DAILY
COMMUNITY

## 2023-12-20 ASSESSMENT — DUKE ACTIVITY SCORE INDEX (DASI)
CAN YOU HAVE SEXUAL RELATIONS: YES
CAN YOU RUN A SHORT DISTANCE: YES
CAN YOU DO MODERATE WORK AROUND THE HOUSE LIKE VACUUMING, SWEEPING FLOORS OR CARRYING GROCERIES: YES
CAN YOU PARTICIPATE IN MODERATE RECREATIONAL ACTIVITIES LIKE GOLF, BOWLING, DANCING, DOUBLES TENNIS OR THROWING A BASEBALL OR FOOTBALL: YES
CAN YOU DO YARD WORK LIKE RAKING LEAVES, WEEDING OR PUSHING A MOWER: YES
CAN YOU TAKE CARE OF YOURSELF (EAT, DRESS, BATHE, OR USE TOILET): YES
CAN YOU PARTICIPATE IN STRENOUS SPORTS LIKE SWIMMING, SINGLES TENNIS, FOOTBALL, BASKETBALL, OR SKIING: NO
CAN YOU DO LIGHT WORK AROUND THE HOUSE LIKE DUSTING OR WASHING DISHES: YES
DASI METS SCORE: 9
CAN YOU WALK A BLOCK OR TWO ON LEVEL GROUND: YES
TOTAL_SCORE: 50.7
CAN YOU WALK INDOORS, SUCH AS AROUND YOUR HOUSE: YES
CAN YOU CLIMB A FLIGHT OF STAIRS OR WALK UP A HILL: YES
CAN YOU DO HEAVY WORK AROUND THE HOUSE LIKE SCRUBBING FLOORS OR LIFTING AND MOVING HEAVY FURNITURE: YES

## 2023-12-20 ASSESSMENT — CHADS2 SCORE
CHADS2 SCORE: 1
PRIOR STROKE OR TIA OR THROMBOEMBOLISM: NO
AGE GREATER THAN OR EQUAL TO 75: NO
DIABETES: NO
CHF: NO
HYPERTENSION: YES

## 2023-12-20 ASSESSMENT — PAIN - FUNCTIONAL ASSESSMENT: PAIN_FUNCTIONAL_ASSESSMENT: 0-10

## 2023-12-20 ASSESSMENT — LIFESTYLE VARIABLES: SMOKING_STATUS: NONSMOKER

## 2023-12-20 ASSESSMENT — PAIN DESCRIPTION - DESCRIPTORS: DESCRIPTORS: ACHING;SHARP;SHOOTING

## 2023-12-20 ASSESSMENT — PAIN SCALES - GENERAL: PAINLEVEL_OUTOF10: 5 - MODERATE PAIN

## 2023-12-20 NOTE — PREPROCEDURE INSTRUCTIONS
Medication List            Accurate as of December 20, 2023  1:48 PM. Always use your most recent med list.                amLODIPine 10 mg tablet  Commonly known as: Norvasc  TAKE ONE TABLET BY MOUTH DAILY     aspirin 81 mg EC tablet  Take 1 tablet (81 mg) by mouth once daily.     multivitamin tablet     omeprazole 20 mg DR capsule  Commonly known as: PriLOSEC  Take 1 capsule (20 mg) by mouth once daily. Do not crush or chew.     rosuvastatin 20 mg tablet  Commonly known as: Crestor  Take 1 tablet (20 mg) by mouth once daily.     valsartan 160 mg tablet  Commonly known as: Diovan                              NPO Instructions:    Do not eat any food after midnight the night before your surgery/procedure.  You may have clear liquids until TWO hours before surgery/procedure. This includes water, black tea/coffee, (no milk or cream) apple juice and electrolyte drinks (Gatorade).    Additional Instructions:     Five Days before Surgery:  Begin using your Hibiclens  Day of Surgery:  You may have clear liquids until TWO hours before surgery/procedure.  This includes water, black tea/coffee, (no milk or cream) apple juice and electrolyte drinks (Gatorade)  You may chew gum up to TWO hours before your surgery/procedure  Wear  comfortable loose fitting clothing  Do not use moisturizers, creams, lotions or perfume   Home Preoperative Antibacterial Shower    What is a home preoperative antibacterial shower?  This shower is a way of cleaning the skin with a germ killing solution before surgery. The solution contains chlorhexidine, commonly known as CHG. CHG is a skin cleanser with germ killing ability. Let your doctor know if you are allergic to chlorhexidine.      Why do I need to take a preoperative antibacterial shower?  Skin is not sterile. It is best to try to make your skin as free of germs as possible before surgery. Proper cleansing with a germ killing soap before surgery can lower the number of germs on your skin.  This helps to reduce the risk of infection at the surgical site. Following the instructions listed below will help you prepare your skin for surgery.    How do I use the solution?      Steps: Begin using your CHG soap FIVE DAYS BEFORE your scheduled surgery on _________DECEMBER 29  First, wash and rinse your hair using the CHG soap. Keep CHG away soap away from ear canals and eyes.  Rinse completely, do not condition. Hair extensions should be removed.  Wash your face with your normal soap and rinse.  Apply the CHG solution to a clean wet washcloth. Turn the water off or move away from the water spray to avoid premature rinsing of the CHG soap as you are applying.  Firmly lather your entire body from neck down. Do not use on face.  Pay special attention to the areas(s) where your incision(s) will be located unless they are on your face.  Avoid scrubbing your skin too hard.  The important point is to have the CHG soap sit on your skin for 3 minutes.  DO NOT wash with regular soap after you have used the CHG soap solution.  Pat yourself dry with a clean, freshly laundered towel.  DO NOT apply powders, deodorants or lotions.  Dress in clean, freshly laundered night clothes.  Be sure to sleep with clean, freshly laundered sheets.  Be aware that CHG will cause stains on fabrics; if you wash them with bleach after use. Rinse your washcloth and other linens that have contact with CHG completely. Use only non-chlorine detergents to launder the items used.  The morning of surgery is the fifth day. Repeat the above steps and dress in clean comfortable clothing.   Patient Information: Oral/Dental Rinse    What is oral/dental rinse?  It is a mouthwash. It is a way of cleaning the mouth with a germ killing solution before your surgery. The solution contains chlorhexidine, commonly know as CHG.  It is used inside the mouth to kill bacteria known as Staphylococcus aureus.  Let your doctor know if you are allergic to  chlorhexidine.    Why do I need to use CHG oral/dental rinse?  The CHG oral/dental rinse helps to kill bacteria in your mouth known as Staphylococcus aureus. This reduces the risk of infection at the surgical site.    Using your CHG oral/dental rinse.  STEPS: use your CHG oral/dental rinse after you brush your teeth the night before (at bedtime) and the morning of your surgery. Follow the directions on your prescription label.  Use the cap on the container to measure 15ml (fill cap to fill line)  Swish (gargle if you can) the mouthwash in your mouth for at least 30 seconds, (do not swallow) spit out.  After you use your CHG rinse, do not rinse your mouth with water, drink or eat. Please refer to prescription label for the appropriate time to resume oral intake.    What side effects might I have using the CHG oral/dental rinse?  CHG rinse will stick to the plaque on the teeth. Brush and floss just before use. Teeth brushing will help avoid staining of plaque during use.    Who should I contact if I have questions about the CHG oral/dental rinse?  Please call [facility], [facility] at [phone] if you have any questions.      Who should I call if I have any questions regarding the use of CHG soap?  Call the [facility] at [phone] if you have any questions.  PAT DISCHARGE INSTRUCTIONS    Please call the Same Day Surgery (SDS) Department of the hospital where your procedure will be performed after 2:00 PM the day before your surgery. If you are scheduled on a Monday, or a Tuesday following a Monday holiday, you will need to call on the last business day prior to your surgery.    [facility]  [id number] [place] [address], [address] [phone]        Please let your surgeon know if:      You develop any open sores, shingles, burning or painful  urination as these may increase your risk of an infection.   You no longer wish to have the surgery.   Any other personal circumstances change that may lead to the need to cancel or defer this surgery-such as being sick or getting admitted to any hospital within one week of your planned procedure.    Your contact details change, such as a change of address or phone number.    Starting now:     Please DO NOT drink alcohol or smoke for 24 hours before surgery. It is well known that quitting smoking can make a huge difference to your health and recovery from surgery. The longer you abstain from smoking, the better your chances of a healthy recovery. If you need help with quitting, call 9-395-QUIT-NOW to be connected to a trained counselor who will discuss the best methods to help you quit.     Before your surgery:    Please stop all supplements 7 days prior to surgery. Or as directed by your surgeon.   Please stop taking NSAID pain medicine such as Advil and Motrin 7 days before surgery.    If you develop any fever, cough, cold, rashes, cuts, scratches, scrapes, urinary symptoms or infection anywhere on your body (including teeth and gums) prior to surgery, please call your surgeon’s office as soon as possible. This may require treatment to reduce the chance of cancellation on the day of surgery.    The day before your surgery:   DIET- Do not eat any food after MIDNIGHT. May have 10 ounces of CLEAR LIQUIDS until TWO HOURS before your arrival time. This includes water, black tea or coffee (no milk ir cream), apple juice and electrolyte drinks (Gatorade). May chew gum until TWO hours before your surgery time.   Get a good night’s rest.  Use the special soap for bathing if you have been instructed to use one.    Scheduled surgery times may change and you will be notified if this occurs - please check your personal voicemail for any updates.     On the morning of surgery:   Wear comfortable, loose fitting clothes which  open in the front. Please do not wear moisturizers, creams, lotions, makeup or perfume.    Please bring with you to surgery:   Photo ID and insurance card   Current list of medicines and allergies   Pacemaker/ Defibrillator/Heart stent cards   CPAP machine and mask    Slings/ splints/ crutches   A copy of your complete advanced directive/DHPOA.    Please do NOT bring with you to surgery:   All jewelry and valuables should be left at home.   Prosthetic devices such as contact lenses, hearing aids, dentures, eyelash extensions, hairpins and body piercings must be removed prior to going in to the surgical suite.    After outpatient surgery:   A responsible adult MUST accompany you at the time of discharge and stay with you for 24 hours after your surgery. You may NOT drive yourself home after surgery.    Do not drive, operate machinery, make critical decisions or do activities that require co-ordination or balance until after a night’s sleep.   Do not drink alcoholic beverages for 24 hours.   Instructions for resuming your medications will be provided by your surgeon.    CALL YOUR DOCTOR AFTER SURGERY IF YOU HAVE:     Chills and/or a fever of 101° F or higher.    Redness, swelling, pus or drainage from your surgical wound or a bad smell from the wound.    Lightheadedness, fainting or confusion.    Persistent vomiting (throwing up) and are not able to eat or drink for 12 hours.    Three or more loose, watery bowel movements in 24 hours (diarrhea).   Difficulty or pain while urinating( after non-urological surgery)    Pain and swelling in your legs, especially if it is only on one side.    Difficulty breathing or are breathing faster than normal.    Any new concerning symptoms.

## 2023-12-20 NOTE — H&P (VIEW-ONLY)
CPM/PAT Evaluation       Name: Fer Kasper (Fer Kasper)  /Age: 1955/68 y.o.     In-Person       Chief Complaint: Left knee pain    HPI 68-year-old male complains of left knee pain with swelling and intermittent instability.  Patient states the symptoms have gotten progressively worse over the years.  Patient had abnormal EKG,  He had cardiac clearance by Dr. Garcia and has been cleared for procedure.  Patient has history hypertension, dyslipidemia, GERD.  He is a former smoker.  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    Brachial neuritis or radiculitis 2010    Cervicalgia 2010    GERD (gastroesophageal reflux disease)     Hyperlipidemia     Hypertension     Kidney stones     Other long term (current) drug therapy 08/10/2016    Use of proton pump inhibitor therapy    Pain in joint, shoulder region 2010    Personal history of other specified conditions 2014    History of abdominal pain       Past Surgical History:   Procedure Laterality Date    COLONOSCOPY  2015    Complete Colonoscopy    ESOPHAGOGASTRODUODENOSCOPY  2015    Diagnostic Esophagogastroduodenoscopy    TRIGGER FINGER RELEASE         Patient  reports being sexually active and has had partner(s) who are female.    Family History   Problem Relation Name Age of Onset    Heart attack Father  62        CARDIAC ARREST AGE 62    Heart attack Paternal Grandfather  65        CARDIAC ARREST AGE 65       No Known Allergies    Current Outpatient Medications   Medication Instructions    amLODIPine (NORVASC) 10 mg, oral, Daily    aspirin 81 mg, oral, Daily    multivitamin tablet 1 tablet, oral, Daily    omeprazole (PRILOSEC) 20 mg, oral, Daily, Do not crush or chew.    rosuvastatin (CRESTOR) 20 mg, oral, Daily    valsartan (DIOVAN) 160 mg, oral, Daily     Review  of Systems   All other systems reviewed and are negative.       Physical Exam  Vitals reviewed. Physical exam within normal limits.   Constitutional:       Appearance: Normal appearance. He is normal weight.   HENT:      Head: Normocephalic and atraumatic.      Mouth/Throat:      Mouth: Mucous membranes are moist.   Eyes:      Extraocular Movements: Extraocular movements intact.      Pupils: Pupils are equal, round, and reactive to light.   Neck:      Vascular: No carotid bruit.   Cardiovascular:      Rate and Rhythm: Normal rate and regular rhythm.      Pulses: Normal pulses.      Heart sounds: Normal heart sounds.   Pulmonary:      Effort: Pulmonary effort is normal.      Breath sounds: Normal breath sounds.   Abdominal:      Palpations: Abdomen is soft.   Musculoskeletal:         General: Normal range of motion.      Cervical back: Normal range of motion.   Lymphadenopathy:      Cervical: No cervical adenopathy.   Skin:     General: Skin is warm and dry.   Neurological:      General: No focal deficit present.      Mental Status: He is alert and oriented to person, place, and time.   Psychiatric:         Mood and Affect: Mood normal.         Behavior: Behavior normal.         Thought Content: Thought content normal.         Judgment: Judgment normal.          PAT AIRWAY:   Airway:     Mallampati::  I    TM distance::  >3 FB    Neck ROM::  Full      Vitals  Heart Rate:  [85]   Temp:  [36 °C (96.8 °F)]   BP: (156)/(86)   Height:  [182.9 cm (6')]   Weight:  [85.1 kg (187 lb 9.8 oz)]   SpO2:  [99 %]        DASI Risk Score      Flowsheet Row Most Recent Value   DASI SCORE 50.7   METS Score (Will be calculated only when all the questions are answered) 9          Caprini DVT Assessment      Flowsheet Row Most Recent Value   DVT Score 7   Current Status Varicose veins, Major surgery planned, lasting 2-3 hours   Age 60-75 years   BMI 30 or less          Modified Frailty Index    No data to display       CHADS2 Stroke Risk   Current as of 25 minutes ago        N/A 3 - 100%: High Risk   2 - 3%: Medium Risk   0 - 2%: Low Risk     Last Change: N/A          This score determines the patient's risk of having a stroke if the patient has atrial fibrillation.        This score is not applicable to this patient. Components are not calculated.          Revised Cardiac Risk Index      Flowsheet Row Most Recent Value   Revised Cardiac Risk Calculator 0          Apfel Simplified Score      Flowsheet Row Most Recent Value   Apfel Simplified Score Calculator 1          Risk Analysis Index Results This Encounter    No data found in the last 1 encounters.       Stop Bang Score      Flowsheet Row Most Recent Value   Do you snore loudly? 0   Do you often feel tired or fatigued after your sleep? 0   Has anyone ever observed you stop breathing in your sleep? 0   Do you have or are you being treated for high blood pressure? 1   Recent BMI (Calculated) 25.4   Is BMI greater than 35 kg/m2? 0=No   Age older than 50 years old? 1=Yes   Is your neck circumference greater than 17 inches (Male) or 16 inches (Female)? 0   Gender - Male 1=Yes   STOP-BANG Total Score 3            Assessment and Plan:     1.  Left knee osteoarthritis   Plan: Left total knee replacement 01/202/2024  2.  Hypertension  3.  CHADS2 score 1  4.  GERD  5.  ASA 2

## 2023-12-20 NOTE — PREPROCEDURE INSTRUCTIONS
Medication List            Accurate as of December 20, 2023  1:54 PM. Always use your most recent med list.                amLODIPine 10 mg tablet  Commonly known as: Norvasc  TAKE ONE TABLET BY MOUTH DAILY  Medication Adjustments for Surgery: Take morning of surgery with sip of water, no other fluids     aspirin 81 mg EC tablet  Take 1 tablet (81 mg) by mouth once daily.  Medication Adjustments for Surgery: Stop 7 days before surgery     multivitamin tablet  Medication Adjustments for Surgery: Stop 7 days before surgery     omeprazole 20 mg DR capsule  Commonly known as: PriLOSEC  Take 1 capsule (20 mg) by mouth once daily. Do not crush or chew.  Medication Adjustments for Surgery: Take morning of surgery with sip of water, no other fluids     rosuvastatin 20 mg tablet  Commonly known as: Crestor  Take 1 tablet (20 mg) by mouth once daily.  Medication Adjustments for Surgery: Continue until night before surgery     valsartan 160 mg tablet  Commonly known as: Diovan  Medication Adjustments for Surgery: Stop 1 day before surgery                              NPO Instructions:    Do not eat any food after midnight the night before your surgery/procedure.  You may have clear liquids until TWO hours before surgery/procedure. This includes water, black tea/coffee, (no milk or cream) apple juice and electrolyte drinks (Gatorade).    Additional Instructions:     Five Days before Surgery:  Begin using your Hibiclens  Day of Surgery:  Review your medication instructions, take indicated medications  You may have clear liquids until TWO hours before surgery/procedure.  This includes water, black tea/coffee, (no milk or cream) apple juice and electrolyte drinks (Gatorade)  Wear  comfortable loose fitting clothing  Do not use moisturizers, creams, lotions or perfume  All jewelry and valuables should be left at home   Home Preoperative Antibacterial Shower    What is a home preoperative antibacterial shower?  This shower is a  way of cleaning the skin with a germ killing solution before surgery. The solution contains chlorhexidine, commonly known as CHG. CHG is a skin cleanser with germ killing ability. Let your doctor know if you are allergic to chlorhexidine.      Why do I need to take a preoperative antibacterial shower?  Skin is not sterile. It is best to try to make your skin as free of germs as possible before surgery. Proper cleansing with a germ killing soap before surgery can lower the number of germs on your skin. This helps to reduce the risk of infection at the surgical site. Following the instructions listed below will help you prepare your skin for surgery.    How do I use the solution?      Steps: Begin using your CHG soap FIVE DAYS BEFORE your scheduled surgery on ___________START______DECEMBER 29  First, wash and rinse your hair using the CHG soap. Keep CHG away soap away from ear canals and eyes.  Rinse completely, do not condition. Hair extensions should be removed.  Wash your face with your normal soap and rinse.  Apply the CHG solution to a clean wet washcloth. Turn the water off or move away from the water spray to avoid premature rinsing of the CHG soap as you are applying.  Firmly lather your entire body from neck down. Do not use on face.  Pay special attention to the areas(s) where your incision(s) will be located unless they are on your face.  Avoid scrubbing your skin too hard.  The important point is to have the CHG soap sit on your skin for 3 minutes.  DO NOT wash with regular soap after you have used the CHG soap solution.  Pat yourself dry with a clean, freshly laundered towel.  DO NOT apply powders, deodorants or lotions.  Dress in clean, freshly laundered night clothes.  Be sure to sleep with clean, freshly laundered sheets.  Be aware that CHG will cause stains on fabrics; if you wash them with bleach after use. Rinse your washcloth and other linens that have contact with CHG completely. Use only  non-chlorine detergents to launder the items used.  The morning of surgery is the fifth day. Repeat the above steps and dress in clean comfortable clothing.   Patient Information: Oral/Dental Rinse    What is oral/dental rinse?  It is a mouthwash. It is a way of cleaning the mouth with a germ killing solution before your surgery. The solution contains chlorhexidine, commonly know as CHG.  It is used inside the mouth to kill bacteria known as Staphylococcus aureus.  Let your doctor know if you are allergic to chlorhexidine.    Why do I need to use CHG oral/dental rinse?  The CHG oral/dental rinse helps to kill bacteria in your mouth known as Staphylococcus aureus. This reduces the risk of infection at the surgical site.    Using your CHG oral/dental rinse.  STEPS: use your CHG oral/dental rinse after you brush your teeth the night before (at bedtime) and the morning of your surgery. Follow the directions on your prescription label.  Use the cap on the container to measure 15ml (fill cap to fill line)  Swish (gargle if you can) the mouthwash in your mouth for at least 30 seconds, (do not swallow) spit out.  After you use your CHG rinse, do not rinse your mouth with water, drink or eat. Please refer to prescription label for the appropriate time to resume oral intake.    What side effects might I have using the CHG oral/dental rinse?  CHG rinse will stick to the plaque on the teeth. Brush and floss just before use. Teeth brushing will help avoid staining of plaque during use.    Who should I contact if I have questions about the CHG oral/dental rinse?  Please call [facility] at [phone] if you have any questions.  PAT DISCHARGE INSTRUCTIONS    Please call the Same Day Surgery (SDS) Department of the hospital where your procedure will be performed after 2:00 PM the day before your surgery. If you are scheduled on a Monday, or a Tuesday following a  Monday holiday, you will need to call on the last business day prior to your surgery.    [facility]  [address] [facility], [address] [phone]    Please let your surgeon know if:      You develop any open sores, shingles, burning or painful urination as these may increase your risk of an infection.   You no longer wish to have the surgery.   Any other personal circumstances change that may lead to the need to cancel or defer this surgery-such as being sick or getting admitted to any hospital within one week of your planned procedure.    Your contact details change, such as a change of address or phone number.    Starting now:     Please DO NOT drink alcohol or smoke for 24 hours before surgery. It is well known that quitting smoking can make a huge difference to your health and recovery from surgery. The longer you abstain from smoking, the better your chances of a healthy recovery. If you need help with quitting, call 1-195-QUITNOW to be connected to a trained counselor who will discuss the best methods to help you quit.     Before your surgery:    Please stop all supplements 7 days prior to surgery. Or as directed by your surgeon.   Please stop taking NSAID pain medicine such as Advil and Motrin 7 days before surgery.    If you develop any fever, cough, cold, rashes, cuts, scratches, scrapes, urinary symptoms or infection anywhere on your body (including teeth and gums) prior to surgery, please call your surgeon’s office as soon as possible. This may require treatment to reduce the chance of cancellation on the day of surgery.    The day before your surgery:   DIET- Do not eat any food after MIDNIGHT. May have 10 ounces of CLEAR LIQUIDS until TWO HOURS before your arrival time. This includes water, black tea or coffee (no milk ir cream), apple juice and electrolyte drinks (Gatorade). May chew gum until TWO hours before your surgery time.   Get a good night’s rest.   Use the special soap for bathing if you have been instructed to use one.    Scheduled surgery times may change and you will be notified if this occurs - please check your personal voicemail for any updates.     On the morning of surgery:   Wear comfortable, loose fitting clothes which open in the front. Please do not wear moisturizers, creams, lotions, makeup or perfume.    Please bring with you to surgery:   Photo ID and insurance card   Current list of medicines and allergies   Pacemaker/ Defibrillator/Heart stent cards   CPAP machine and mask    Slings/ splints/ crutches   A copy of your complete advanced directive/DHPOA.    Please do NOT bring with you to surgery:   All jewelry and valuables should be left at home.   Prosthetic devices such as contact lenses, hearing aids, dentures, eyelash extensions, hairpins and body piercings must be removed prior to going in to the surgical suite.    After outpatient surgery:   A responsible adult MUST accompany you at the time of discharge and stay with you for 24 hours after your surgery. You may NOT drive yourself home after surgery.    Do not drive, operate machinery, make critical decisions or do activities that require co-ordination or balance until after a night’s sleep.   Do not drink alcoholic beverages for 24 hours.   Instructions for resuming your medications will be provided by your surgeon.    CALL YOUR DOCTOR AFTER SURGERY IF YOU HAVE:     Chills and/or a fever of 101° F or higher.    Redness, swelling, pus or drainage from your surgical wound or a bad smell from the wound.    Lightheadedness, fainting or confusion.    Persistent vomiting (throwing up) and are not able to eat or drink for 12 hours.    Three or more loose, watery bowel movements in 24 hours (diarrhea).   Difficulty or pain while urinating( after non-urological surgery)    Pain and swelling in your legs, especially if it is only on one side.    Difficulty breathing or are breathing faster than  normal.    Any new concerning symptoms.      Who should I call if I have any questions regarding the use of CHG soap?  Call the [facility] at [phone] if you have any questions.

## 2024-01-02 ENCOUNTER — ANESTHESIA (OUTPATIENT)
Dept: OPERATING ROOM | Facility: HOSPITAL | Age: 69
End: 2024-01-02
Payer: MEDICARE

## 2024-01-02 ENCOUNTER — ANESTHESIA EVENT (OUTPATIENT)
Dept: OPERATING ROOM | Facility: HOSPITAL | Age: 69
End: 2024-01-02
Payer: MEDICARE

## 2024-01-02 ENCOUNTER — APPOINTMENT (OUTPATIENT)
Dept: RADIOLOGY | Facility: HOSPITAL | Age: 69
End: 2024-01-02
Payer: MEDICARE

## 2024-01-02 ENCOUNTER — HOSPITAL ENCOUNTER (OUTPATIENT)
Facility: HOSPITAL | Age: 69
Discharge: HOME | End: 2024-01-03
Attending: ORTHOPAEDIC SURGERY | Admitting: ORTHOPAEDIC SURGERY
Payer: MEDICARE

## 2024-01-02 DIAGNOSIS — M17.12 ARTHRITIS OF LEFT KNEE: Primary | ICD-10-CM

## 2024-01-02 PROCEDURE — 2500000005 HC RX 250 GENERAL PHARMACY W/O HCPCS: Performed by: ANESTHESIOLOGIST ASSISTANT

## 2024-01-02 PROCEDURE — C1713 ANCHOR/SCREW BN/BN,TIS/BN: HCPCS | Performed by: ORTHOPAEDIC SURGERY

## 2024-01-02 PROCEDURE — C1776 JOINT DEVICE (IMPLANTABLE): HCPCS | Performed by: ORTHOPAEDIC SURGERY

## 2024-01-02 PROCEDURE — 2500000004 HC RX 250 GENERAL PHARMACY W/ HCPCS (ALT 636 FOR OP/ED): Performed by: ANESTHESIOLOGIST ASSISTANT

## 2024-01-02 PROCEDURE — 3700000002 HC GENERAL ANESTHESIA TIME - EACH INCREMENTAL 1 MINUTE: Performed by: ORTHOPAEDIC SURGERY

## 2024-01-02 PROCEDURE — A4649 SURGICAL SUPPLIES: HCPCS | Performed by: ORTHOPAEDIC SURGERY

## 2024-01-02 PROCEDURE — 2780000003 HC OR 278 NO HCPCS: Performed by: ORTHOPAEDIC SURGERY

## 2024-01-02 PROCEDURE — 73560 X-RAY EXAM OF KNEE 1 OR 2: CPT | Mod: LT

## 2024-01-02 PROCEDURE — 2720000007 HC OR 272 NO HCPCS: Performed by: ORTHOPAEDIC SURGERY

## 2024-01-02 PROCEDURE — 2500000004 HC RX 250 GENERAL PHARMACY W/ HCPCS (ALT 636 FOR OP/ED): Performed by: ORTHOPAEDIC SURGERY

## 2024-01-02 PROCEDURE — 7100000011 HC EXTENDED STAY RECOVERY HOURLY - NURSING UNIT

## 2024-01-02 PROCEDURE — 7100000001 HC RECOVERY ROOM TIME - INITIAL BASE CHARGE: Performed by: ORTHOPAEDIC SURGERY

## 2024-01-02 PROCEDURE — 2500000001 HC RX 250 WO HCPCS SELF ADMINISTERED DRUGS (ALT 637 FOR MEDICARE OP): Performed by: ORTHOPAEDIC SURGERY

## 2024-01-02 PROCEDURE — 2500000005 HC RX 250 GENERAL PHARMACY W/O HCPCS: Performed by: ORTHOPAEDIC SURGERY

## 2024-01-02 PROCEDURE — A27447 PR TOTAL KNEE ARTHROPLASTY: Performed by: ANESTHESIOLOGIST ASSISTANT

## 2024-01-02 PROCEDURE — 2500000004 HC RX 250 GENERAL PHARMACY W/ HCPCS (ALT 636 FOR OP/ED): Performed by: ANESTHESIOLOGY

## 2024-01-02 PROCEDURE — 3600000018 HC OR TIME - INITIAL BASE CHARGE - PROCEDURE LEVEL SIX: Performed by: ORTHOPAEDIC SURGERY

## 2024-01-02 PROCEDURE — 3700000001 HC GENERAL ANESTHESIA TIME - INITIAL BASE CHARGE: Performed by: ORTHOPAEDIC SURGERY

## 2024-01-02 PROCEDURE — 3600000017 HC OR TIME - EACH INCREMENTAL 1 MINUTE - PROCEDURE LEVEL SIX: Performed by: ORTHOPAEDIC SURGERY

## 2024-01-02 PROCEDURE — A27447 PR TOTAL KNEE ARTHROPLASTY: Performed by: ANESTHESIOLOGY

## 2024-01-02 PROCEDURE — A6213 FOAM DRG >16<=48 SQ IN W/BDR: HCPCS | Performed by: ORTHOPAEDIC SURGERY

## 2024-01-02 PROCEDURE — 7100000002 HC RECOVERY ROOM TIME - EACH INCREMENTAL 1 MINUTE: Performed by: ORTHOPAEDIC SURGERY

## 2024-01-02 DEVICE — TIBIAL BEARING INSERT
Type: IMPLANTABLE DEVICE | Site: KNEE | Status: FUNCTIONAL
Brand: TRIATHLON

## 2024-01-02 DEVICE — PATELLA
Type: IMPLANTABLE DEVICE | Site: KNEE | Status: FUNCTIONAL
Brand: TRIATHLON

## 2024-01-02 DEVICE — SIMPLEX® HV IS A FAST-SETTING ACRYLIC RESIN FOR USE IN BONE SURGERY. MIXING THE TWO SEPARATE STERILE COMPONENTS PRODUCES A DUCTILE BONE CEMENT WHICH, AFTER HARDENING, FIXES THE IMPLANT AND TRANSFERS STRESSES PRODUCED DURING MOVEMENT EVENLY TO THE BONE. SIMPLEX® HV CEMENT POWDER ALSO CONTAINS INSOLUBLE ZIRCONIUM DIOXIDE AS AN X-RAY CONTRAST MEDIUM. SIMPLEX® HV DOES NOT EMIT A SIGNAL AND DOES NOT POSE A SAFETY RISK IN A MAGNETIC RESONANCE ENVIRONMENT.
Type: IMPLANTABLE DEVICE | Site: KNEE | Status: FUNCTIONAL
Brand: SIMPLEX HV

## 2024-01-02 DEVICE — PRIMARY TIBIAL BASEPLATE
Type: IMPLANTABLE DEVICE | Site: KNEE | Status: FUNCTIONAL
Brand: TRIATHLON

## 2024-01-02 DEVICE — POSTERIOR STABILIZED FEMORAL
Type: IMPLANTABLE DEVICE | Site: KNEE | Status: FUNCTIONAL
Brand: TRIATHLON

## 2024-01-02 RX ORDER — AMLODIPINE BESYLATE 10 MG/1
10 TABLET ORAL DAILY
Status: DISCONTINUED | OUTPATIENT
Start: 2024-01-03 | End: 2024-01-03 | Stop reason: HOSPADM

## 2024-01-02 RX ORDER — LIDOCAINE HYDROCHLORIDE 10 MG/ML
0.1 INJECTION INFILTRATION; PERINEURAL ONCE
Status: DISCONTINUED | OUTPATIENT
Start: 2024-01-02 | End: 2024-01-02 | Stop reason: HOSPADM

## 2024-01-02 RX ORDER — OXYCODONE HCL 10 MG/1
20 TABLET, FILM COATED, EXTENDED RELEASE ORAL ONCE
Status: DISCONTINUED | OUTPATIENT
Start: 2024-01-02 | End: 2024-01-02 | Stop reason: SDUPTHER

## 2024-01-02 RX ORDER — CEFAZOLIN SODIUM 2 G/100ML
2 INJECTION, SOLUTION INTRAVENOUS EVERY 8 HOURS
Status: COMPLETED | OUTPATIENT
Start: 2024-01-02 | End: 2024-01-03

## 2024-01-02 RX ORDER — KETOROLAC TROMETHAMINE 30 MG/ML
15 INJECTION, SOLUTION INTRAMUSCULAR; INTRAVENOUS EVERY 6 HOURS PRN
Status: DISCONTINUED | OUTPATIENT
Start: 2024-01-02 | End: 2024-01-03 | Stop reason: HOSPADM

## 2024-01-02 RX ORDER — FENTANYL CITRATE 50 UG/ML
50 INJECTION, SOLUTION INTRAMUSCULAR; INTRAVENOUS EVERY 5 MIN PRN
Status: DISCONTINUED | OUTPATIENT
Start: 2024-01-02 | End: 2024-01-02 | Stop reason: HOSPADM

## 2024-01-02 RX ORDER — ROSUVASTATIN CALCIUM 20 MG/1
20 TABLET, COATED ORAL NIGHTLY
Status: DISCONTINUED | OUTPATIENT
Start: 2024-01-03 | End: 2024-01-03 | Stop reason: HOSPADM

## 2024-01-02 RX ORDER — OXYCODONE HYDROCHLORIDE 5 MG/1
5 TABLET ORAL EVERY 4 HOURS PRN
Status: DISCONTINUED | OUTPATIENT
Start: 2024-01-02 | End: 2024-01-03 | Stop reason: HOSPADM

## 2024-01-02 RX ORDER — ACETAMINOPHEN 500 MG
1000 TABLET ORAL EVERY 6 HOURS SCHEDULED
Status: DISCONTINUED | OUTPATIENT
Start: 2024-01-02 | End: 2024-01-03 | Stop reason: HOSPADM

## 2024-01-02 RX ORDER — PANTOPRAZOLE SODIUM 40 MG/1
40 TABLET, DELAYED RELEASE ORAL
Status: DISCONTINUED | OUTPATIENT
Start: 2024-01-03 | End: 2024-01-03 | Stop reason: HOSPADM

## 2024-01-02 RX ORDER — SODIUM CHLORIDE, SODIUM LACTATE, POTASSIUM CHLORIDE, CALCIUM CHLORIDE 600; 310; 30; 20 MG/100ML; MG/100ML; MG/100ML; MG/100ML
100 INJECTION, SOLUTION INTRAVENOUS CONTINUOUS
Status: DISCONTINUED | OUTPATIENT
Start: 2024-01-02 | End: 2024-01-02 | Stop reason: SDUPTHER

## 2024-01-02 RX ORDER — PREGABALIN 75 MG/1
75 CAPSULE ORAL ONCE
Status: COMPLETED | OUTPATIENT
Start: 2024-01-02 | End: 2024-01-02

## 2024-01-02 RX ORDER — CEFAZOLIN SODIUM 2 G/100ML
2 INJECTION, SOLUTION INTRAVENOUS ONCE
Status: COMPLETED | OUTPATIENT
Start: 2024-01-02 | End: 2024-01-02

## 2024-01-02 RX ORDER — FENTANYL CITRATE 50 UG/ML
50 INJECTION, SOLUTION INTRAMUSCULAR; INTRAVENOUS ONCE
Status: COMPLETED | OUTPATIENT
Start: 2024-01-02 | End: 2024-01-02

## 2024-01-02 RX ORDER — DEXAMETHASONE SODIUM PHOSPHATE 4 MG/ML
INJECTION, SOLUTION INTRA-ARTICULAR; INTRALESIONAL; INTRAMUSCULAR; INTRAVENOUS; SOFT TISSUE AS NEEDED
Status: DISCONTINUED | OUTPATIENT
Start: 2024-01-02 | End: 2024-01-02

## 2024-01-02 RX ORDER — BISMUTH SUBSALICYLATE 262 MG
1 TABLET,CHEWABLE ORAL DAILY
Status: DISCONTINUED | OUTPATIENT
Start: 2024-01-02 | End: 2024-01-03 | Stop reason: HOSPADM

## 2024-01-02 RX ORDER — FENTANYL CITRATE 50 UG/ML
25 INJECTION, SOLUTION INTRAMUSCULAR; INTRAVENOUS EVERY 5 MIN PRN
Status: DISCONTINUED | OUTPATIENT
Start: 2024-01-02 | End: 2024-01-02 | Stop reason: HOSPADM

## 2024-01-02 RX ORDER — TRANEXAMIC ACID 100 MG/ML
INJECTION, SOLUTION INTRAVENOUS AS NEEDED
Status: DISCONTINUED | OUTPATIENT
Start: 2024-01-02 | End: 2024-01-02

## 2024-01-02 RX ORDER — PREGABALIN 75 MG/1
75 CAPSULE ORAL ONCE
Status: DISCONTINUED | OUTPATIENT
Start: 2024-01-02 | End: 2024-01-02 | Stop reason: SDUPTHER

## 2024-01-02 RX ORDER — ONDANSETRON HYDROCHLORIDE 2 MG/ML
4 INJECTION, SOLUTION INTRAVENOUS ONCE AS NEEDED
Status: DISCONTINUED | OUTPATIENT
Start: 2024-01-02 | End: 2024-01-02 | Stop reason: HOSPADM

## 2024-01-02 RX ORDER — ACETAMINOPHEN 325 MG/1
650 TABLET ORAL ONCE
Status: COMPLETED | OUTPATIENT
Start: 2024-01-02 | End: 2024-01-02

## 2024-01-02 RX ORDER — SODIUM CHLORIDE, SODIUM LACTATE, POTASSIUM CHLORIDE, CALCIUM CHLORIDE 600; 310; 30; 20 MG/100ML; MG/100ML; MG/100ML; MG/100ML
100 INJECTION, SOLUTION INTRAVENOUS CONTINUOUS
Status: DISCONTINUED | OUTPATIENT
Start: 2024-01-02 | End: 2024-01-02 | Stop reason: HOSPADM

## 2024-01-02 RX ORDER — LIDOCAINE HYDROCHLORIDE 10 MG/ML
INJECTION INFILTRATION; PERINEURAL AS NEEDED
Status: DISCONTINUED | OUTPATIENT
Start: 2024-01-02 | End: 2024-01-02

## 2024-01-02 RX ORDER — CELECOXIB 200 MG/1
400 CAPSULE ORAL ONCE
Status: COMPLETED | OUTPATIENT
Start: 2024-01-02 | End: 2024-01-02

## 2024-01-02 RX ORDER — ACETAMINOPHEN 325 MG/1
650 TABLET ORAL ONCE
Status: DISCONTINUED | OUTPATIENT
Start: 2024-01-02 | End: 2024-01-02 | Stop reason: SDUPTHER

## 2024-01-02 RX ORDER — POLYETHYLENE GLYCOL 3350 17 G/17G
17 POWDER, FOR SOLUTION ORAL DAILY
Status: DISCONTINUED | OUTPATIENT
Start: 2024-01-02 | End: 2024-01-03 | Stop reason: HOSPADM

## 2024-01-02 RX ORDER — SODIUM CHLORIDE, SODIUM LACTATE, POTASSIUM CHLORIDE, CALCIUM CHLORIDE 600; 310; 30; 20 MG/100ML; MG/100ML; MG/100ML; MG/100ML
100 INJECTION, SOLUTION INTRAVENOUS CONTINUOUS
Status: DISCONTINUED | OUTPATIENT
Start: 2024-01-02 | End: 2024-01-02

## 2024-01-02 RX ORDER — CELECOXIB 200 MG/1
400 CAPSULE ORAL ONCE
Status: DISCONTINUED | OUTPATIENT
Start: 2024-01-02 | End: 2024-01-02 | Stop reason: SDUPTHER

## 2024-01-02 RX ORDER — FENTANYL CITRATE 50 UG/ML
INJECTION, SOLUTION INTRAMUSCULAR; INTRAVENOUS AS NEEDED
Status: DISCONTINUED | OUTPATIENT
Start: 2024-01-02 | End: 2024-01-02

## 2024-01-02 RX ORDER — OXYCODONE HCL 10 MG/1
20 TABLET, FILM COATED, EXTENDED RELEASE ORAL ONCE
Status: COMPLETED | OUTPATIENT
Start: 2024-01-02 | End: 2024-01-02

## 2024-01-02 RX ORDER — OXYCODONE HYDROCHLORIDE 5 MG/1
10 TABLET ORAL EVERY 4 HOURS PRN
Status: DISCONTINUED | OUTPATIENT
Start: 2024-01-02 | End: 2024-01-03 | Stop reason: HOSPADM

## 2024-01-02 RX ORDER — CEFAZOLIN SODIUM 2 G/50ML
2 SOLUTION INTRAVENOUS ONCE
Status: DISCONTINUED | OUTPATIENT
Start: 2024-01-02 | End: 2024-01-02 | Stop reason: SDUPTHER

## 2024-01-02 RX ORDER — ONDANSETRON HYDROCHLORIDE 2 MG/ML
4 INJECTION, SOLUTION INTRAVENOUS EVERY 8 HOURS PRN
Status: DISCONTINUED | OUTPATIENT
Start: 2024-01-02 | End: 2024-01-03 | Stop reason: HOSPADM

## 2024-01-02 RX ORDER — ONDANSETRON HYDROCHLORIDE 2 MG/ML
INJECTION, SOLUTION INTRAVENOUS AS NEEDED
Status: DISCONTINUED | OUTPATIENT
Start: 2024-01-02 | End: 2024-01-02

## 2024-01-02 RX ORDER — MIDAZOLAM HYDROCHLORIDE 1 MG/ML
2 INJECTION, SOLUTION INTRAMUSCULAR; INTRAVENOUS ONCE
Status: COMPLETED | OUTPATIENT
Start: 2024-01-02 | End: 2024-01-02

## 2024-01-02 RX ORDER — OXYCODONE HYDROCHLORIDE 5 MG/1
5 TABLET ORAL EVERY 4 HOURS PRN
Status: DISCONTINUED | OUTPATIENT
Start: 2024-01-02 | End: 2024-01-02 | Stop reason: HOSPADM

## 2024-01-02 RX ORDER — PROPOFOL 10 MG/ML
INJECTION, EMULSION INTRAVENOUS AS NEEDED
Status: DISCONTINUED | OUTPATIENT
Start: 2024-01-02 | End: 2024-01-02

## 2024-01-02 RX ORDER — GLYCOPYRROLATE 0.2 MG/ML
INJECTION INTRAMUSCULAR; INTRAVENOUS AS NEEDED
Status: DISCONTINUED | OUTPATIENT
Start: 2024-01-02 | End: 2024-01-02

## 2024-01-02 RX ORDER — MORPHINE SULFATE 2 MG/ML
2 INJECTION, SOLUTION INTRAMUSCULAR; INTRAVENOUS EVERY 2 HOUR PRN
Status: DISCONTINUED | OUTPATIENT
Start: 2024-01-02 | End: 2024-01-03 | Stop reason: HOSPADM

## 2024-01-02 RX ORDER — DOCUSATE SODIUM 100 MG/1
100 CAPSULE, LIQUID FILLED ORAL 2 TIMES DAILY
Status: DISCONTINUED | OUTPATIENT
Start: 2024-01-02 | End: 2024-01-03 | Stop reason: HOSPADM

## 2024-01-02 RX ORDER — LABETALOL HYDROCHLORIDE 5 MG/ML
5 INJECTION, SOLUTION INTRAVENOUS ONCE AS NEEDED
Status: DISCONTINUED | OUTPATIENT
Start: 2024-01-02 | End: 2024-01-02 | Stop reason: HOSPADM

## 2024-01-02 RX ORDER — ASPIRIN 81 MG/1
81 TABLET ORAL 2 TIMES DAILY
Status: DISCONTINUED | OUTPATIENT
Start: 2024-01-02 | End: 2024-01-03 | Stop reason: HOSPADM

## 2024-01-02 RX ADMIN — FENTANYL CITRATE 25 MCG: 50 INJECTION, SOLUTION INTRAMUSCULAR; INTRAVENOUS at 09:47

## 2024-01-02 RX ADMIN — FENTANYL CITRATE 25 MCG: 50 INJECTION, SOLUTION INTRAMUSCULAR; INTRAVENOUS at 09:42

## 2024-01-02 RX ADMIN — SODIUM CHLORIDE, SODIUM LACTATE, POTASSIUM CHLORIDE, AND CALCIUM CHLORIDE: 600; 310; 30; 20 INJECTION, SOLUTION INTRAVENOUS at 09:42

## 2024-01-02 RX ADMIN — FENTANYL CITRATE 25 MCG: 0.05 INJECTION, SOLUTION INTRAMUSCULAR; INTRAVENOUS at 11:17

## 2024-01-02 RX ADMIN — LIDOCAINE HYDROCHLORIDE 5 ML: 10 INJECTION, SOLUTION INFILTRATION; PERINEURAL at 07:49

## 2024-01-02 RX ADMIN — SODIUM CHLORIDE, SODIUM LACTATE, POTASSIUM CHLORIDE, AND CALCIUM CHLORIDE 100 ML/HR: 600; 310; 30; 20 INJECTION, SOLUTION INTRAVENOUS at 06:33

## 2024-01-02 RX ADMIN — FENTANYL CITRATE 50 MCG: 50 INJECTION, SOLUTION INTRAMUSCULAR; INTRAVENOUS at 09:56

## 2024-01-02 RX ADMIN — CEFAZOLIN SODIUM 2 G: 2 INJECTION, SOLUTION INTRAVENOUS at 07:57

## 2024-01-02 RX ADMIN — FENTANYL CITRATE 50 MCG: 0.05 INJECTION, SOLUTION INTRAMUSCULAR; INTRAVENOUS at 07:31

## 2024-01-02 RX ADMIN — CEFAZOLIN SODIUM 2 G: 2 INJECTION, SOLUTION INTRAVENOUS at 17:56

## 2024-01-02 RX ADMIN — ASPIRIN 81 MG: 81 TABLET, COATED ORAL at 20:34

## 2024-01-02 RX ADMIN — FENTANYL CITRATE 25 MCG: 50 INJECTION, SOLUTION INTRAMUSCULAR; INTRAVENOUS at 09:52

## 2024-01-02 RX ADMIN — FENTANYL CITRATE 50 MCG: 0.05 INJECTION, SOLUTION INTRAMUSCULAR; INTRAVENOUS at 10:15

## 2024-01-02 RX ADMIN — TRANEXAMIC ACID 1000 MG: 100 INJECTION, SOLUTION INTRAVENOUS at 08:51

## 2024-01-02 RX ADMIN — FENTANYL CITRATE 25 MCG: 50 INJECTION, SOLUTION INTRAMUSCULAR; INTRAVENOUS at 09:59

## 2024-01-02 RX ADMIN — Medication 2 L/MIN: at 16:30

## 2024-01-02 RX ADMIN — FENTANYL CITRATE 25 MCG: 50 INJECTION, SOLUTION INTRAMUSCULAR; INTRAVENOUS at 09:49

## 2024-01-02 RX ADMIN — DEXAMETHASONE SODIUM PHOSPHATE 8 MG: 4 INJECTION, SOLUTION INTRA-ARTICULAR; INTRALESIONAL; INTRAMUSCULAR; INTRAVENOUS; SOFT TISSUE at 07:58

## 2024-01-02 RX ADMIN — FENTANYL CITRATE 25 MCG: 50 INJECTION, SOLUTION INTRAMUSCULAR; INTRAVENOUS at 09:46

## 2024-01-02 RX ADMIN — DOCUSATE SODIUM 100 MG: 100 CAPSULE, LIQUID FILLED ORAL at 20:34

## 2024-01-02 RX ADMIN — OXYCODONE HYDROCHLORIDE 20 MG: 10 TABLET, FILM COATED, EXTENDED RELEASE ORAL at 06:16

## 2024-01-02 RX ADMIN — ONDANSETRON HYDROCHLORIDE 4 MG: 2 INJECTION INTRAMUSCULAR; INTRAVENOUS at 09:19

## 2024-01-02 RX ADMIN — PREGABALIN 75 MG: 75 CAPSULE ORAL at 06:16

## 2024-01-02 RX ADMIN — GLYCOPYRROLATE 0.2 MG: 0.2 INJECTION INTRAMUSCULAR; INTRAVENOUS at 08:14

## 2024-01-02 RX ADMIN — OXYCODONE 5 MG: 5 TABLET ORAL at 17:56

## 2024-01-02 RX ADMIN — TRANEXAMIC ACID 1000 MG: 100 INJECTION, SOLUTION INTRAVENOUS at 07:49

## 2024-01-02 RX ADMIN — FENTANYL CITRATE 25 MCG: 50 INJECTION, SOLUTION INTRAMUSCULAR; INTRAVENOUS at 09:39

## 2024-01-02 RX ADMIN — OXYCODONE 5 MG: 5 TABLET ORAL at 22:51

## 2024-01-02 RX ADMIN — FENTANYL CITRATE 25 MCG: 50 INJECTION, SOLUTION INTRAMUSCULAR; INTRAVENOUS at 09:48

## 2024-01-02 RX ADMIN — ACETAMINOPHEN 1000 MG: 500 TABLET ORAL at 17:57

## 2024-01-02 RX ADMIN — FENTANYL CITRATE 50 MCG: 50 INJECTION, SOLUTION INTRAMUSCULAR; INTRAVENOUS at 09:58

## 2024-01-02 RX ADMIN — FENTANYL CITRATE 25 MCG: 50 INJECTION, SOLUTION INTRAMUSCULAR; INTRAVENOUS at 08:54

## 2024-01-02 RX ADMIN — FENTANYL CITRATE 50 MCG: 0.05 INJECTION, SOLUTION INTRAMUSCULAR; INTRAVENOUS at 10:45

## 2024-01-02 RX ADMIN — MEPERIDINE HYDROCHLORIDE 12.5 MG: 25 INJECTION, SOLUTION INTRAMUSCULAR; INTRAVENOUS; SUBCUTANEOUS at 10:31

## 2024-01-02 RX ADMIN — MIDAZOLAM HYDROCHLORIDE 2 MG: 1 INJECTION, SOLUTION INTRAMUSCULAR; INTRAVENOUS at 07:31

## 2024-01-02 RX ADMIN — FENTANYL CITRATE 25 MCG: 50 INJECTION, SOLUTION INTRAMUSCULAR; INTRAVENOUS at 08:29

## 2024-01-02 RX ADMIN — FENTANYL CITRATE 50 MCG: 50 INJECTION, SOLUTION INTRAMUSCULAR; INTRAVENOUS at 09:57

## 2024-01-02 RX ADMIN — FENTANYL CITRATE 25 MCG: 0.05 INJECTION, SOLUTION INTRAMUSCULAR; INTRAVENOUS at 11:37

## 2024-01-02 RX ADMIN — CELECOXIB 400 MG: 200 CAPSULE ORAL at 06:16

## 2024-01-02 RX ADMIN — PROPOFOL 200 MG: 10 INJECTION, EMULSION INTRAVENOUS at 07:49

## 2024-01-02 RX ADMIN — POVIDONE-IODINE 1 APPLICATION: 5 SOLUTION TOPICAL at 06:16

## 2024-01-02 RX ADMIN — ACETAMINOPHEN 650 MG: 325 TABLET ORAL at 06:16

## 2024-01-02 SDOH — SOCIAL STABILITY: SOCIAL INSECURITY: DO YOU FEEL UNSAFE GOING BACK TO THE PLACE WHERE YOU ARE LIVING?: NO

## 2024-01-02 SDOH — SOCIAL STABILITY: SOCIAL INSECURITY: HAVE YOU HAD THOUGHTS OF HARMING ANYONE ELSE?: NO

## 2024-01-02 SDOH — SOCIAL STABILITY: SOCIAL INSECURITY: ARE YOU OR HAVE YOU BEEN THREATENED OR ABUSED PHYSICALLY, EMOTIONALLY, OR SEXUALLY BY ANYONE?: NO

## 2024-01-02 SDOH — SOCIAL STABILITY: SOCIAL INSECURITY: DOES ANYONE TRY TO KEEP YOU FROM HAVING/CONTACTING OTHER FRIENDS OR DOING THINGS OUTSIDE YOUR HOME?: NO

## 2024-01-02 SDOH — HEALTH STABILITY: MENTAL HEALTH: CURRENT SMOKER: 0

## 2024-01-02 SDOH — SOCIAL STABILITY: SOCIAL INSECURITY: ARE THERE ANY APPARENT SIGNS OF INJURIES/BEHAVIORS THAT COULD BE RELATED TO ABUSE/NEGLECT?: NO

## 2024-01-02 SDOH — SOCIAL STABILITY: SOCIAL INSECURITY: WERE YOU ABLE TO COMPLETE ALL THE BEHAVIORAL HEALTH SCREENINGS?: YES

## 2024-01-02 SDOH — SOCIAL STABILITY: SOCIAL INSECURITY: ABUSE: ADULT

## 2024-01-02 SDOH — SOCIAL STABILITY: SOCIAL INSECURITY: DO YOU FEEL ANYONE HAS EXPLOITED OR TAKEN ADVANTAGE OF YOU FINANCIALLY OR OF YOUR PERSONAL PROPERTY?: NO

## 2024-01-02 SDOH — SOCIAL STABILITY: SOCIAL INSECURITY: HAS ANYONE EVER THREATENED TO HURT YOUR FAMILY OR YOUR PETS?: NO

## 2024-01-02 ASSESSMENT — LIFESTYLE VARIABLES
PRESCIPTION_ABUSE_PAST_12_MONTHS: NO
AUDIT-C TOTAL SCORE: 0
AUDIT-C TOTAL SCORE: 0
SKIP TO QUESTIONS 9-10: 1
HOW OFTEN DO YOU HAVE 6 OR MORE DRINKS ON ONE OCCASION: NEVER
HOW OFTEN DO YOU HAVE A DRINK CONTAINING ALCOHOL: NEVER
HOW MANY STANDARD DRINKS CONTAINING ALCOHOL DO YOU HAVE ON A TYPICAL DAY: PATIENT DOES NOT DRINK
SUBSTANCE_ABUSE_PAST_12_MONTHS: NO

## 2024-01-02 ASSESSMENT — PAIN - FUNCTIONAL ASSESSMENT
PAIN_FUNCTIONAL_ASSESSMENT: 0-10
PAIN_FUNCTIONAL_ASSESSMENT: CPOT (CRITICAL CARE PAIN OBSERVATION TOOL)
PAIN_FUNCTIONAL_ASSESSMENT: 0-10

## 2024-01-02 ASSESSMENT — PAIN SCALES - GENERAL
PAINLEVEL_OUTOF10: 4
PAINLEVEL_OUTOF10: 0 - NO PAIN
PAINLEVEL_OUTOF10: 3
PAINLEVEL_OUTOF10: 6
PAINLEVEL_OUTOF10: 6
PAINLEVEL_OUTOF10: 4
PAINLEVEL_OUTOF10: 3
PAINLEVEL_OUTOF10: 7
PAINLEVEL_OUTOF10: 2
PAINLEVEL_OUTOF10: 3
PAINLEVEL_OUTOF10: 4
PAINLEVEL_OUTOF10: 3
PAINLEVEL_OUTOF10: 3
PAINLEVEL_OUTOF10: 6
PAINLEVEL_OUTOF10: 5 - MODERATE PAIN
PAINLEVEL_OUTOF10: 3

## 2024-01-02 ASSESSMENT — PAIN DESCRIPTION - ORIENTATION
ORIENTATION: LEFT

## 2024-01-02 ASSESSMENT — ACTIVITIES OF DAILY LIVING (ADL)
DRESSING YOURSELF: INDEPENDENT
HEARING - LEFT EAR: FUNCTIONAL
WALKS IN HOME: INDEPENDENT
TOILETING: INDEPENDENT
JUDGMENT_ADEQUATE_SAFELY_COMPLETE_DAILY_ACTIVITIES: YES
BATHING: INDEPENDENT
GROOMING: INDEPENDENT
LACK_OF_TRANSPORTATION: NO
FEEDING YOURSELF: INDEPENDENT
PATIENT'S MEMORY ADEQUATE TO SAFELY COMPLETE DAILY ACTIVITIES?: YES
ADEQUATE_TO_COMPLETE_ADL: YES
HEARING - RIGHT EAR: FUNCTIONAL

## 2024-01-02 ASSESSMENT — PAIN DESCRIPTION - LOCATION
LOCATION: KNEE

## 2024-01-02 ASSESSMENT — PATIENT HEALTH QUESTIONNAIRE - PHQ9
SUM OF ALL RESPONSES TO PHQ9 QUESTIONS 1 & 2: 0
2. FEELING DOWN, DEPRESSED OR HOPELESS: NOT AT ALL
1. LITTLE INTEREST OR PLEASURE IN DOING THINGS: NOT AT ALL

## 2024-01-02 ASSESSMENT — COLUMBIA-SUICIDE SEVERITY RATING SCALE - C-SSRS
6. HAVE YOU EVER DONE ANYTHING, STARTED TO DO ANYTHING, OR PREPARED TO DO ANYTHING TO END YOUR LIFE?: NO
2. HAVE YOU ACTUALLY HAD ANY THOUGHTS OF KILLING YOURSELF?: NO
1. IN THE PAST MONTH, HAVE YOU WISHED YOU WERE DEAD OR WISHED YOU COULD GO TO SLEEP AND NOT WAKE UP?: NO

## 2024-01-02 ASSESSMENT — COGNITIVE AND FUNCTIONAL STATUS - GENERAL
WALKING IN HOSPITAL ROOM: A LITTLE
MOBILITY SCORE: 20
DRESSING REGULAR LOWER BODY CLOTHING: A LITTLE
DAILY ACTIVITIY SCORE: 23
STANDING UP FROM CHAIR USING ARMS: A LITTLE
CLIMB 3 TO 5 STEPS WITH RAILING: A LITTLE
MOVING TO AND FROM BED TO CHAIR: A LITTLE
PATIENT BASELINE BEDBOUND: NO

## 2024-01-02 ASSESSMENT — PAIN DESCRIPTION - DESCRIPTORS: DESCRIPTORS: ACHING

## 2024-01-02 NOTE — ANESTHESIA PROCEDURE NOTES
Airway  Date/Time: 1/2/2024 7:50 AM  Urgency: elective    Airway not difficult    Staffing  Performed: MATILDE   Authorized by: Antony Enciso MD    Performed by: MATILDE Warner  Patient location during procedure: OR    Indications and Patient Condition  Indications for airway management: anesthesia  Spontaneous ventilation: present  Sedation level: deep  Preoxygenated: yes  Patient position: sniffing  MILS not maintained throughout  Mask difficulty assessment: 0 - not attempted    Final Airway Details  Final airway type: supraglottic airway      Successful airway: Size 5     Number of attempts at approach: 1  Number of other approaches attempted: 0

## 2024-01-02 NOTE — NURSING NOTE
Pt admitted to unit from PACU. Surgical dressing to L knee is intact without drainage to dressing. Hemovac intact. Pt has clemente catheter draining light yellow urine. Patient states he is tolerating liquids without nausea. Peripheral pulses palpable.

## 2024-01-02 NOTE — NURSING NOTE
1008- Pt admitted to PACU from OR with anesthesia and OR present. O2 NRB @ 8 L O2. IV #20 in right ac with 2nd bag of LR infusing. Clemente to CD with stat lock placed upon admission to right thigh. Left knee dressing with stockinette wrap D&I.Ice to knee.  Exitng hemovac drain to suction with bloody output.     1015- Medicated for 9 of10 pain in left knee with Fentanyl 50mcg IVP. Taking PO ice chips for c/o dry mouth.     1031-Pt shaking.No c/o coldness. Medicated with Demerol 12.5mg IVP.    1046- Pain sustained. Medicated with 50 mcg Fentanyl for 6-7 of 10 pain in left knee. Shaking subsided. Anesthesai made aware of cardiac arrhythmias. No new orders given. Awaiting post op x ray.    1125- X ray complete. Pt comfortable. Awaiting room assignment.     1131-Medicated with  25 mcg Fentanyl for 4-5 of 10 pain. Resting comfortably. Wife notified. Awaiting room assignment    1145-Report to Michelle COBOS to assume care of the pt. For lunch coverage.     1230- Resumed care of the pt. Presented with food and drink. Wife at the bedside.POC maintained. Pain tolerable at 3.5 of 10. Drain to suction. Ice on knee. Clemente patent.    1330-No bed available at this time. Pt consumed po fluids and nutrition. Wife at bedside. VSS. POC maintained.     1445-Wife remains at bedside. Pain well controlled at 3 of 10. Ice in place. Drain to suction and clemente to CD. IV patent and infusing. No issues. Updated on bed situation. Pt held in PACU. Given menu to contemplate food order.    1500-Report to RN to assume care of pt in holding in PACU.

## 2024-01-02 NOTE — ANESTHESIA PREPROCEDURE EVALUATION
Patient: Fer Kasper    Procedure Information       Date/Time: 01/02/24 0745    Procedure: Arthroplasty Total Knee (Left: Knee)    Location: [facility] OR 08 / [facility]    Surgeons: Landry Agustin MD            Relevant Problems   Cardiovascular   (+) Hypercholesterolemia   (+) Hypertension      GI   (+) GERD (gastroesophageal reflux disease)      Other   (+) Arthritis of left knee       Clinical information reviewed:   Tobacco  Allergies  Meds   Med Hx  Surg Hx   Fam Hx  Soc Hx        NPO Detail:  NPO/Void Status  Carbonhydrate Drink Given Prior to Surgery? : N  Date of Last Liquid: 01/02/24  Time of Last Liquid: 0315 (Small sip of water with meds)  Date of Last Solid: 01/01/24  Time of Last Solid: 1900  Last Intake Type: Clear fluids  Time of Last Void: 0500         Physical Exam    Airway  Mallampati: I  TM distance: >3 FB  Neck ROM: full     Cardiovascular    Dental    Pulmonary    Abdominal            Anesthesia Plan    ASA 2     general and regional     The patient is not a current smoker.    intravenous induction   Postoperative administration of opioids is intended.  Trial extubation is planned.  Anesthetic plan and risks discussed with patient.  Use of blood products discussed with patient who.    Plan discussed with attending and CRNA.

## 2024-01-02 NOTE — DISCHARGE INSTRUCTIONS
Dressing: remove after 1 week. Call if any drainage is coming out of the bandage.  Weight bearing: weight bear as tolerated  Showering: may shower  Bathing: no tub bathing or pools  Driving: no driving    Call with any concerns.     Hold Diovan and Losartan until lightheadedness resolves.   Once lightheadedness resolves, resume Diovan (Valsartan) only   Call Dr. Garcia with any questions   Schedule follow up with Dr. Garcia in 2-4 weeks.   Scheduled follow up with PCP in one week.

## 2024-01-02 NOTE — OP NOTE
Arthroplasty Total Knee  [address] - [place] ARTHRP KNE CONDYLE&PLATU MEDIAL&LAT COMPARTMENTS   Operative Note     Date: 2024  OR Location: [facility] OR    Name: Fer Kasper, : 1955, Age: 68 y.o., MRN: [id number], Sex: male    PRE Diagnosis  Left knee osteoarthritis    POST Diagnosis  Same    Procedures  Left total knee arthroplasty-subvastus approach    Surgeons      * Landry Agustin - Primary    Resident/Fellow/Other Assistant:  lisa Rutledge mitch    Procedure Summary  Implants:  Styker Cemented Triathlon Total Knee: Femur size 6 posterior stabilized, size 7 primary tibial baseplate, size A38 N2Vac Patella, size 7x13 posterior stabilized N2Vac tibial bearing insert     Anesthesia: LMA with adductor canal block  ASA: II  Anesthesia Staff:   Anesthesiologist: Antony Enciso MD  C-AA: MATILDE Warner  Estimated Blood Loss: 50    Specimen: No specimens collected     Staff:   Circulator: Nicole Quintana RN; Nicole Farris RN  Scrub Person: Christina Hernandez; Kenia Burch         Drains and/or Catheters:   2 Hemovac    Tourniquet Times:     Total Tourniquet Time Documented:  Thigh (Left) - 64 minutes  Total: Thigh (Left) - 64 minutes             Findings: Bipartite patella.  Severe degenerative change medial compartment with bone-on-bone apposition.  Stable implantation of the above components.  Neutral alignment stable in extension to an axial load and exam.  Patella tracked normally through a subvastus approach.    Indications: Fer Kasper is an 68 y.o. male who is having surgery for LEFT KNEE OSTEOARTHRITIS.   Present with pain and symptoms bilateral knees.  Left worse than right.  X-rays revealed degenerative changes.  Affecting daily activities not relieved with conservative care.  He wished to proceed with a knee replacement surgery.  I discussed with the patient the risks, benefits, alternatives, complications of a total knee replacement.  The risks, including but not limited to,  deep vein thrombosis, pulmonary embolism, infection, knee stiffness, periprosthetic fracture, damage to ligaments tendons or neurovascular structures was discussed.  Despite these risks they elected to proceed.      Procedure Details: Medications:  Antibiotic Ancef 2 g IV,Tranexamic acid 2 g IV, Decadron 10 milligrams IV    Patient was seen and evaluated in the preoperative area and the left leg was marked as the operative extremity.  They were then brought back to the operating room after anesthesia administered an adductor canal block.  Patient was laid supine on the table and anesthesia team controlled the head neck and airway administered general anesthetic.  De Leon catheter was inserted.  The well leg was wrapped and secured to the table.  The operative leg was prepped with a well-padded thigh tourniquet.  We then washed the skin with a chlorhexidine wash and alcohol.  We then prepped with a ChloraPrep and draped in the usual fashion.  An operative time-out was performed.  I then exsanguinated the leg with an Esmarch and elevated the tourniquet to 250 millimeters of mercury.      I marked out the anterior landmarks of the knee and a midline incision.  I created the incision with the scalpel and used the Bovie to dissect down through the subcutaneous tissue to the level of the extensor mechanism.  I created full-thickness skin flaps medial and laterally.  Identified the VMO and placed a Hohmann retractor deep to this to retract the extensor mechanism laterally.  Marked out the subvastus approach.  I created this with the scalpel.  I then used the Bovie to dissect a subperiosteal dissection off the proximal medial tibia.  I removed the infrapatellar fat pad.  I released the lateral synovium and was able to flex the knee and translate the patella laterally.  Patient had severe wear of the medial compartment.  I marked out Whitesides line and debrided the anterior cruciate ligament.  I debrided the intercondylar  notch and used a drill to enter the femoral canal.  I then used a flexible intramedullary alignment guide with distal cutting jig set for 5 degree valgus with an 8 millimeter resection.  I set this on the more prominent medial condyle.  I pinned this into position and protected the soft tissues while I  resected the distal femur.  My cut measured at 8 millimeters.   I now turned my attention to the tibia.  I used a extramedullary guide centered in the tibial spines.  I aligned it with respect to the medial 3rd of the tubercle, to tibial crest, and centered over talus.  I adjusted for a neutral slope.  I pinned the block in position with  a 6 millimeter resection off the medial side.  I then used a drop alice as a secondary check.  I then protected the soft tissues and resected the proximal tibia.  I measured the lateral thickness at 10 millimeters.  I now brought the knee into extension and used the gap balancing block.  I was satisfied with the alignment and the extension gap.  Now returned to the femur using the 3 degree external rotation sizing guide and pinned this into position  with respect to Whitesides line, epicondylar axis and a tibial cut.  I measured the femur at a size 6. I placed the 4 in 1 cutting block and pinned this down.  I then resected the anterior and posterior condyles and chamfers.  I then cleaned the bone cuts with a rongeur and an osteotome.  I now placed the box cutting guide in line with the lateral border of the femur.  I pinned this into position then used a distal chisel and oscillating saw to resect the box and removed the PCL with the Bovie.  I now clean the posterior aspect of the knee with the Bovie removing the medial and lateral meniscus in their entirety.  I preserved the popliteus tendon.  I then took a curved osteotome and resected the posterior condylar osteophytes bilaterally  decompressing the posterior space.  I provisionally sized the tibia to a size 6. I did a trial  reduction with the femur, tibia and a 11 millimeter insert.   I brought the knee through range of motion was satisfied with the tracking and varus valgus stability throughout.  The native patella tracked normally.  I everted the patella with 2 towel clips.  I circumferentially burned around this with the Bovie.    The patella measured 24 in thickness. I took a freehand resection down to 14 millimeters of bone circumferentially.   I sized an asymmetric 38.  There is a bipartite segment along the superior lateral aspect of the patella.  The quadriceps tendon was partially inserting along the segment.  There was movement at the growth center.  This was partially covered by the patellar trial.  I alligned the drill guide with the medial border and drilled for the cemented component.  I then placed a trial and tested the kinematics and the patella tracked normally through the sub vastus approach.  I now removed the trial components and finished my tibial preparation.  Seem that the size 6 was undersized and I positioned a size 7 baseplate.  I pinned the base plate into position and then used the keel punch and an oversized keel punched for the cemented technique.  I now removed this and thoroughly irrigated the soft tissue and bone in preparation for implantation.  I used standard cementation technique.  I flexed the knee and translated the tibia forward.  I finger packed the cement into the keel and then seated the tibial component reducing the removing the extra cement.  I then finger packed cement onto the distal femur seated the femoral component and placed the trial polyethylene as the cement cured.  I also irrigated clean and dried the patella seated the patellar component. After cement had thoroughly cured I tested the kinematics with the size 11 13 millimeter insert. I was satisfied with the tracking, alignment, and stability of the knee with the size 13 insert.  I removed this insert trial, the portion of the  posterior lateral joint capsule appeared to be overlying the baseplate.  This was partially debrided.  I cleaned the locking mechanism and seated the posterior stabilized tibial bearing insert.  The fit was secure medial and laterally.  I extended the knee and irrigated with Betadine.  I washed this out with normal saline.  I then placed 1 intra-articular drain and closed the arthrotomy in a semi flexed position with #2 Ethibond for a watertight seal tested through range of motion.  I then placed an extra-articular drain, irrigated and closed in a layered fashion of running 0 Vicryl, buried interrupted 2-0 Vicryl, running 4-0 Monocryl.  Dermabond and a silver dressing were applied.  The patient tolerated procedure well and was awakened taken to PACU in stable condition.  No complications encountered.    Complications:  None; patient tolerated the procedure well.    Disposition: PACU - hemodynamically stable.  Condition: stable             Attending Attestation: I performed the procedure.    Landry Agustin  Phone Number: [phone]

## 2024-01-02 NOTE — CARE PLAN
The patient's goals for the shift include      The clinical goals for the shift include pain managment, safety

## 2024-01-02 NOTE — PERIOPERATIVE NURSING NOTE
"0519--Patient ambulated to [facility] accompanied by wife Johana. Patient is alert and oriented, rates pain in left knee at a \"5-6/10\" at this time and reports his skin is intact. Patient said he last took his aspirin 2 weeks ago and did his CHG wash x5 days and mouthwash last night and this morning.  "

## 2024-01-03 ENCOUNTER — PHARMACY VISIT (OUTPATIENT)
Dept: PHARMACY | Facility: CLINIC | Age: 69
End: 2024-01-03
Payer: COMMERCIAL

## 2024-01-03 VITALS
DIASTOLIC BLOOD PRESSURE: 58 MMHG | BODY MASS INDEX: 25.41 KG/M2 | SYSTOLIC BLOOD PRESSURE: 119 MMHG | HEIGHT: 72 IN | OXYGEN SATURATION: 97 % | RESPIRATION RATE: 16 BRPM | HEART RATE: 57 BPM | TEMPERATURE: 98.4 F | WEIGHT: 187.61 LBS

## 2024-01-03 LAB
ANION GAP SERPL CALC-SCNC: 10 MMOL/L
BUN SERPL-MCNC: 25 MG/DL (ref 8–25)
CALCIUM SERPL-MCNC: 8.9 MG/DL (ref 8.5–10.4)
CHLORIDE SERPL-SCNC: 101 MMOL/L (ref 97–107)
CO2 SERPL-SCNC: 24 MMOL/L (ref 24–31)
CREAT SERPL-MCNC: 1.2 MG/DL (ref 0.4–1.6)
ERYTHROCYTE [DISTWIDTH] IN BLOOD BY AUTOMATED COUNT: 12 % (ref 11.5–14.5)
GFR SERPL CREATININE-BSD FRML MDRD: 66 ML/MIN/1.73M*2
GLUCOSE SERPL-MCNC: 123 MG/DL (ref 65–99)
HCT VFR BLD AUTO: 35.6 % (ref 41–52)
HGB BLD-MCNC: 11.7 G/DL (ref 13.5–17.5)
MCH RBC QN AUTO: 30.8 PG (ref 26–34)
MCHC RBC AUTO-ENTMCNC: 32.9 G/DL (ref 32–36)
MCV RBC AUTO: 94 FL (ref 80–100)
NRBC BLD-RTO: 0 /100 WBCS (ref 0–0)
PLATELET # BLD AUTO: 195 X10*3/UL (ref 150–450)
POTASSIUM SERPL-SCNC: 4.7 MMOL/L (ref 3.4–5.1)
RBC # BLD AUTO: 3.8 X10*6/UL (ref 4.5–5.9)
SODIUM SERPL-SCNC: 135 MMOL/L (ref 133–145)
WBC # BLD AUTO: 7.4 X10*3/UL (ref 4.4–11.3)

## 2024-01-03 PROCEDURE — 7100000011 HC EXTENDED STAY RECOVERY HOURLY - NURSING UNIT

## 2024-01-03 PROCEDURE — 2500000004 HC RX 250 GENERAL PHARMACY W/ HCPCS (ALT 636 FOR OP/ED): Performed by: ORTHOPAEDIC SURGERY

## 2024-01-03 PROCEDURE — 97110 THERAPEUTIC EXERCISES: CPT | Mod: GP | Performed by: PHYSICAL THERAPIST

## 2024-01-03 PROCEDURE — 80048 BASIC METABOLIC PNL TOTAL CA: CPT | Performed by: ORTHOPAEDIC SURGERY

## 2024-01-03 PROCEDURE — RXMED WILLOW AMBULATORY MEDICATION CHARGE

## 2024-01-03 PROCEDURE — 97165 OT EVAL LOW COMPLEX 30 MIN: CPT | Mod: GO

## 2024-01-03 PROCEDURE — 85027 COMPLETE CBC AUTOMATED: CPT | Performed by: ORTHOPAEDIC SURGERY

## 2024-01-03 PROCEDURE — 97530 THERAPEUTIC ACTIVITIES: CPT | Mod: GP | Performed by: PHYSICAL THERAPIST

## 2024-01-03 PROCEDURE — 2500000001 HC RX 250 WO HCPCS SELF ADMINISTERED DRUGS (ALT 637 FOR MEDICARE OP): Performed by: ORTHOPAEDIC SURGERY

## 2024-01-03 PROCEDURE — 36415 COLL VENOUS BLD VENIPUNCTURE: CPT | Performed by: ORTHOPAEDIC SURGERY

## 2024-01-03 PROCEDURE — 97161 PT EVAL LOW COMPLEX 20 MIN: CPT | Mod: GP | Performed by: PHYSICAL THERAPIST

## 2024-01-03 PROCEDURE — 97535 SELF CARE MNGMENT TRAINING: CPT | Mod: GO

## 2024-01-03 PROCEDURE — 94760 N-INVAS EAR/PLS OXIMETRY 1: CPT

## 2024-01-03 PROCEDURE — 64447 NJX AA&/STRD FEMORAL NRV IMG: CPT | Performed by: ANESTHESIOLOGY

## 2024-01-03 RX ORDER — OXYCODONE HYDROCHLORIDE 5 MG/1
5 TABLET ORAL EVERY 4 HOURS PRN
Qty: 40 TABLET | Refills: 0 | Status: SHIPPED | OUTPATIENT
Start: 2024-01-03 | End: 2024-01-10

## 2024-01-03 RX ORDER — IBUPROFEN 600 MG/1
600 TABLET ORAL EVERY 6 HOURS PRN
Qty: 20 TABLET | Refills: 0 | Status: SHIPPED | OUTPATIENT
Start: 2024-01-03 | End: 2024-01-08

## 2024-01-03 RX ORDER — ASPIRIN 81 MG/1
81 TABLET ORAL 2 TIMES DAILY
Qty: 56 TABLET | Refills: 0 | Status: SHIPPED | OUTPATIENT
Start: 2024-01-03 | End: 2024-01-31

## 2024-01-03 RX ORDER — DOCUSATE SODIUM 100 MG/1
100 CAPSULE, LIQUID FILLED ORAL 2 TIMES DAILY
Qty: 30 CAPSULE | Refills: 0 | Status: SHIPPED | OUTPATIENT
Start: 2024-01-03 | End: 2024-01-18

## 2024-01-03 RX ORDER — ACETAMINOPHEN 500 MG
1000 TABLET ORAL EVERY 6 HOURS PRN
Qty: 120 TABLET | Refills: 0 | Status: SHIPPED | OUTPATIENT
Start: 2024-01-03 | End: 2024-01-18

## 2024-01-03 RX ADMIN — OXYCODONE 10 MG: 5 TABLET ORAL at 10:32

## 2024-01-03 RX ADMIN — ASPIRIN 81 MG: 81 TABLET, COATED ORAL at 08:58

## 2024-01-03 RX ADMIN — DOCUSATE SODIUM 100 MG: 100 CAPSULE, LIQUID FILLED ORAL at 08:57

## 2024-01-03 RX ADMIN — ACETAMINOPHEN 1000 MG: 500 TABLET ORAL at 00:21

## 2024-01-03 RX ADMIN — ACETAMINOPHEN 1000 MG: 500 TABLET ORAL at 05:10

## 2024-01-03 RX ADMIN — AMLODIPINE BESYLATE 10 MG: 10 TABLET ORAL at 08:58

## 2024-01-03 RX ADMIN — POLYETHYLENE GLYCOL 3350 17 G: 17 POWDER, FOR SOLUTION ORAL at 08:57

## 2024-01-03 RX ADMIN — KETOROLAC TROMETHAMINE 15 MG: 30 INJECTION INTRAMUSCULAR; INTRAVENOUS at 08:57

## 2024-01-03 RX ADMIN — ACETAMINOPHEN 1000 MG: 500 TABLET ORAL at 13:04

## 2024-01-03 RX ADMIN — CEFAZOLIN SODIUM 2 G: 2 INJECTION, SOLUTION INTRAVENOUS at 00:21

## 2024-01-03 RX ADMIN — OXYCODONE 10 MG: 5 TABLET ORAL at 05:10

## 2024-01-03 RX ADMIN — PANTOPRAZOLE SODIUM 40 MG: 40 TABLET, DELAYED RELEASE ORAL at 06:25

## 2024-01-03 RX ADMIN — MULTIVITAMIN TABLET 1 TABLET: TABLET at 08:58

## 2024-01-03 ASSESSMENT — ENCOUNTER SYMPTOMS
GASTROINTESTINAL NEGATIVE: 1
NEUROLOGICAL NEGATIVE: 1
ENDOCRINE NEGATIVE: 1
EYES NEGATIVE: 1
CARDIOVASCULAR NEGATIVE: 1
CONSTITUTIONAL NEGATIVE: 1

## 2024-01-03 ASSESSMENT — PAIN DESCRIPTION - LOCATION: LOCATION: KNEE

## 2024-01-03 ASSESSMENT — PAIN - FUNCTIONAL ASSESSMENT
PAIN_FUNCTIONAL_ASSESSMENT: 0-10
PAIN_FUNCTIONAL_ASSESSMENT: 0-10
PAIN_FUNCTIONAL_ASSESSMENT: FLACC (FACE, LEGS, ACTIVITY, CRY, CONSOLABILITY)
PAIN_FUNCTIONAL_ASSESSMENT: 0-10

## 2024-01-03 ASSESSMENT — COGNITIVE AND FUNCTIONAL STATUS - GENERAL
TURNING FROM BACK TO SIDE WHILE IN FLAT BAD: A LITTLE
DRESSING REGULAR LOWER BODY CLOTHING: A LITTLE
HELP NEEDED FOR BATHING: A LITTLE
STANDING UP FROM CHAIR USING ARMS: A LITTLE
WALKING IN HOSPITAL ROOM: A LITTLE
TOILETING: A LITTLE
MOVING TO AND FROM BED TO CHAIR: A LITTLE
CLIMB 3 TO 5 STEPS WITH RAILING: A LOT
MOVING TO AND FROM BED TO CHAIR: A LITTLE
CLIMB 3 TO 5 STEPS WITH RAILING: A LITTLE
MOBILITY SCORE: 23
HELP NEEDED FOR BATHING: A LITTLE
DAILY ACTIVITIY SCORE: 20
CLIMB 3 TO 5 STEPS WITH RAILING: A LITTLE
TOILETING: A LITTLE
MOBILITY SCORE: 18
MOVING FROM LYING ON BACK TO SITTING ON SIDE OF FLAT BED WITH BEDRAILS: A LITTLE
STANDING UP FROM CHAIR USING ARMS: A LITTLE
MOBILITY SCORE: 19
DRESSING REGULAR UPPER BODY CLOTHING: A LITTLE
DAILY ACTIVITIY SCORE: 20
WALKING IN HOSPITAL ROOM: A LITTLE
DRESSING REGULAR LOWER BODY CLOTHING: A LOT

## 2024-01-03 ASSESSMENT — PAIN SCALES - GENERAL
PAINLEVEL_OUTOF10: 2
PAINLEVEL_OUTOF10: 0 - NO PAIN
PAINLEVEL_OUTOF10: 4
PAINLEVEL_OUTOF10: 7
PAINLEVEL_OUTOF10: 5 - MODERATE PAIN
PAINLEVEL_OUTOF10: 7
PAINLEVEL_OUTOF10: 7

## 2024-01-03 ASSESSMENT — ACTIVITIES OF DAILY LIVING (ADL)
BATHING_ASSISTANCE: MODERATE
HOME_MANAGEMENT_TIME_ENTRY: 20

## 2024-01-03 ASSESSMENT — PAIN DESCRIPTION - ORIENTATION: ORIENTATION: LEFT

## 2024-01-03 NOTE — PROGRESS NOTES
Physical Therapy    Physical Therapy Treatment    Patient Name: Fer Kasper  MRN: [id number]  Today's Date: 1/3/2024  Time Calculation  Start Time: 1413  Stop Time: 1440  Time Calculation (min): 27 min       Assessment/Plan   PT Assessment  PT Assessment Results: Decreased strength, Decreased range of motion, Decreased mobility, Pain  Rehab Prognosis: Good  Strengths: Support and attitude of living partners, Housing layout  Assessment Comment: Pt made adequate progress toward their functional mobility goals. Pt required up to CGA/close S during functional mobility. Pt would benefit from continued skilled PT services prior to and after discharge to increase independence and safety with functional mobility within their home environment.  End of Session Patient Position: Up in chair, Alarm off, not on at start of session (call light and needs within reach)     PT Plan  Treatment/Interventions: Bed mobility, Transfer training, Gait training, Stair training, Strengthening, Range of motion, Therapeutic exercise, Therapeutic activity  PT Plan: Skilled PT  PT Frequency: BID  PT Discharge Recommendations: Low intensity level of continued care  Equipment Recommended upon Discharge: Wheeled walker  PT Recommended Transfer Status: Stand by assist  PT - OK to Discharge: Yes (with continued skilled PT services at next level of care)      General Visit Information:   PT  Visit  PT Received On: 01/03/24  General  Reason for Referral: impaired functional mobility. This 68 year old male was admitted for a planned surgery and underwent L TKA on 1/2/23.  Referred By: Landry Agustin MD  Past Medical History Relevant to Rehab: HTN, GERD, cervicalgia, R boutonniere deformity, brachial neuritis  Family/Caregiver Present: Yes (spouse)  Prior to Session Communication: Bedside nurse (RN cleared pt for participation.)  Patient Position Received: Up in chair  General Comment: Pt agreed to session and participated fully.    Subjective    Precautions:  Precautions  Hearing/Visual Limitations: reading glasses; hearing WFL  Medical Precautions: Fall precautions  Post-Surgical Precautions: Left total knee precautions    Objective   Pain:  Pain Assessment  Pain Assessment: 0-10  Pain Score: 4  Pain Type: Surgical pain  Pain Location: Knee  Pain Orientation: Left  Pain Interventions: Ambulation/increased activity    Cognition:  Cognition  Overall Cognitive Status: Within Functional Limits (A&O x4)    Postural Control:  Postural Control  Postural Control: Within Functional Limits  Posture Comment: fwd head posture    Extremity/Trunk Assessments:  RLE   RLE : Within Functional Limits  LLE   LLE : Within Functional Limits (except L knee ~5°-70°)    Activity Tolerance:  Activity Tolerance  Endurance: Endurance does not limit participation in activity    Treatments:  Therapeutic Exercise  Therapeutic Exercise Performed: Yes (x20 each. HEP hand out issued. Cued as needed for proper form to obtain maximal benefits.)  Therapeutic Exercise Activity 1: bilat DF/PF  Therapeutic Exercise Activity 2: bilat QS  Therapeutic Exercise Activity 3: L knee flex (supine)  Therapeutic Exercise Activity 4: L SAQ  Therapeutic Exercise Activity 5: L LAQ  Therapeutic Exercise Activity 6: L knee flex (seated)    Bed Mobility  Bed Mobility: Yes  Bed Mobility 1  Bed Mobility 1: Sitting to supine  Level of Assistance 1: Modified independent (using leg  to elevate LLE)  Bed Mobility Comments 1: HOB elevated, toward R side    Ambulation/Gait Training  Ambulation/Gait Training Performed: Yes  Ambulation/Gait Training 1  Surface 1: Level tile  Device 1: Rolling walker  Assistance 1: Close supervision  Comments/Distance (ft) 1: 300 ft using minimal step through pattern, slowed velocity, and discontinuous movement. Steady gait.    Transfers  Transfer: Yes  Transfer 1  Technique 1: Sit to stand  Transfer Device 1: Walker  Transfer Level of Assistance 1: Modified  independent  Trials/Comments 1: x1from bedside chair  Transfers 2  Technique 2: Stand to sit  Transfer Device 2: Walker  Transfer Level of Assistance 2: Modified independent  Trials/Comments 2: x1 trial at bedside chair & x1 trial at EOB    Stairs  Stairs: Yes  Stairs  Rails 1: Left  Assistance 1: Close supervision  Comment/Number of Steps 1: 3 stairs using step-to-step technique with sequencing to protect LLE. Trial 2: 3 stairs without rail and CGA x1. Same technique and sequencing as above. Steady with slowed pacing throughout.    Outcome Measures:  [facility] Basic Mobility  Turning from your back to your side while in a flat bed without using bedrails: None  Moving from lying on your back to sitting on the side of a flat bed without using bedrails: None  Moving to and from bed to chair (including a wheelchair): None  Standing up from a chair using your arms (e.g. wheelchair or bedside chair): None  To walk in hospital room: None  Climbing 3-5 steps with railing: A little  Basic Mobility - Total Score: 23    Education Documentation  Handouts, taught by Morelia Jacobsen PT at 1/3/2024  3:22 PM.  Learner: Patient  Readiness: Acceptance  Method: Explanation, Handout  Response: Verbalizes Understanding, Demonstrated Understanding, Needs Reinforcement    Precautions, taught by Morelia Jacobsen PT at 1/3/2024  3:22 PM.  Learner: Patient  Readiness: Acceptance  Method: Explanation, Handout  Response: Verbalizes Understanding, Demonstrated Understanding, Needs Reinforcement    Home Exercise Program, taught by Morelia Jacobsen PT at 1/3/2024  3:22 PM.  Learner: Patient  Readiness: Acceptance  Method: Explanation, Handout  Response: Verbalizes Understanding, Demonstrated Understanding, Needs Reinforcement    Mobility Training, taught by Morelia Jacobsen PT at 1/3/2024  3:22 PM.  Learner: Patient  Readiness: Acceptance  Method: Explanation, Handout  Response: Verbalizes Understanding, Demonstrated Understanding, Needs  Reinforcement    Education Comments  No comments found.        OP EDUCATION:       Encounter Problems       Encounter Problems (Active)       Mobility       Perform functional mobility to and from the bathroom with close supervision and 2ww.  (Progressing)       Start:  01/03/24    Expected End:  02/03/24               Mobility       STG - Patient will ambulate >/=150 ft with FWW at mod I level. (Progressing)       Start:  01/03/24    Expected End:  01/18/24            STG - Patient will ascend and descend 2 stairs with straight cane and Leticia x1. (Progressing)       Start:  01/03/24    Expected End:  01/18/24               Pain - Adult          Transfers       LTG - Patient will perform all functional transfers independent with 2ww (Progressing)       Start:  01/03/24    Expected End:  02/03/24               Transfers       STG - Patient to transfer supine<>sit at mod I level at flat bed. (Progressing)       Start:  01/03/24    Expected End:  01/18/24            STG - Patient will transfer sit<>stand with FWW at mod I level. (Met)       Start:  01/03/24    Expected End:  01/18/24    Resolved:  01/03/24

## 2024-01-03 NOTE — DISCHARGE SUMMARY
Discharge Diagnosis  Arthritis of left knee    Issues Requiring Follow-Up  Left total knee replacement    Test Results Pending At Discharge  Pending Labs       No current pending labs.            Hospital Course   Patient brought to the operating room day of admission for left total knee replacement.  Uncomplicated surgery admitted to regular nursing floor.  Plan for home with home health care.    Pertinent Physical Exam At Time of Discharge  Physical Exam    Home Medications     Medication List      START taking these medications     acetaminophen 500 mg tablet; Commonly known as: Tylenol; Take 2 tablets   (1,000 mg) by mouth every 6 hours if needed for mild pain (1 - 3) for up   to 15 days.   docusate sodium 100 mg capsule; Commonly known as: Colace; Take 1   capsule (100 mg) by mouth 2 times a day for 15 days.   ibuprofen 600 mg tablet; Take 1 tablet (600 mg) by mouth every 6 hours   if needed for mild pain (1 - 3) for up to 5 days.   oxyCODONE 5 mg immediate release tablet; Commonly known as: Roxicodone;   Take 1 tablet (5 mg) by mouth every 4 hours if needed for severe pain (7 -   10) for up to 7 days.     CHANGE how you take these medications     * aspirin 81 mg EC tablet; Take 1 tablet (81 mg) by mouth once daily.;   What changed: Another medication with the same name was added. Make sure   you understand how and when to take each.   * aspirin 81 mg EC tablet; Take 1 tablet (81 mg) by mouth 2 times a day   for 28 days.; What changed: You were already taking a medication with the   same name, and this prescription was added. Make sure you understand how   and when to take each.  * This list has 2 medication(s) that are the same as other medications   prescribed for you. Read the directions carefully, and ask your doctor or   other care provider to review them with you.     CONTINUE taking these medications     amLODIPine 10 mg tablet; Commonly known as: Norvasc; TAKE ONE TABLET BY   MOUTH DAILY   multivitamin  tablet   omeprazole 20 mg DR capsule; Commonly known as: PriLOSEC; Take 1 capsule   (20 mg) by mouth once daily. Do not crush or chew.   rosuvastatin 20 mg tablet; Commonly known as: Crestor; Take 1 tablet (20   mg) by mouth once daily.   valsartan 160 mg tablet; Commonly known as: Diovan       Outpatient Follow-Up  Future Appointments   Date Time Provider Department Center   8/9/2024  9:00 AM Watson Carmichael MD [id number]       Landry Agustin MD

## 2024-01-03 NOTE — CARE PLAN
Problem: Bathing  Goal: STG - Patient will bathe body lower body independent with AE   Outcome: Progressing     Problem: Dressings Lower Extremities  Goal: LTG - Patient will dress lower body independent with AE  Outcome: Progressing     Problem: Mobility  Goal: Perform functional mobility to and from the bathroom with close supervision and 2ww.   Outcome: Progressing     Problem: Transfers  Goal: LTG - Patient will perform all functional transfers independent with 2ww  Outcome: Progressing     Problem: Therapeutic Acitivity  Goal: patient will implement surgical precautions independently during ADLs and mobility  Outcome: Progressing

## 2024-01-03 NOTE — CONSULTS
Consults    Reason For Consult  Hypertension     History Of Present Illness  Fer Kasper is a 68 y.o. male presenting with left knee pain. S/P left total knee arthoplasty 1/2/24. Currently, resting in bed. States pain is well controlled with current regimen. Hopeful to be discharged to home later today. Denies chest pain, shortness of breath, abdominal pain, fevers, chills.      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    Surgical History  He has a past surgical history that includes Esophagogastroduodenoscopy (04/29/2015); Colonoscopy (04/29/2015); Trigger finger release; and Tonsillectomy.     Social History  He reports that he quit smoking about 37 years ago. His smoking use included cigarettes. He started smoking about 49 years ago. He has a 6.00 pack-year smoking history. He has been exposed to tobacco smoke. He has never used smokeless tobacco. He reports current alcohol use of about 22.0 standard drinks of alcohol per week. He reports current drug use. Drug: Marijuana.    Family History  Family History   Problem Relation Name Age of Onset    Heart attack Father  62        CARDIAC ARREST AGE 62    Heart attack Paternal Grandfather  65        CARDIAC ARREST AGE 65        Allergies  Patient has no known allergies.    Review of Systems   Constitutional: Negative.    HENT: Negative.     Eyes: Negative.    Cardiovascular: Negative.    Gastrointestinal: Negative.    Endocrine: Negative.    Musculoskeletal:         Bilateral knee pain related to arthritis    Skin: Negative.    Neurological: Negative.          Physical Exam  Constitutional:       Appearance: Normal appearance.   HENT:      Head: Normocephalic and atraumatic.      Mouth/Throat:      Mouth: Mucous membranes are moist.      Pharynx: Oropharynx is clear.   Eyes:      Extraocular Movements: Extraocular movements intact.      Conjunctiva/sclera: Conjunctivae normal.      Pupils: Pupils are equal, round, and reactive to light.   Cardiovascular:      Rate and Rhythm: Normal rate and regular rhythm.      Pulses: Normal pulses.      Heart sounds: Normal heart sounds.   Pulmonary:      Effort: Pulmonary effort is normal.      Breath sounds: Normal breath sounds.   Abdominal:      General: Bowel sounds are normal.      Palpations: Abdomen is soft.      Tenderness: There is no abdominal tenderness.   Musculoskeletal:      Cervical back: Normal range of motion and neck supple.      Comments: Left knee dressing intact   Skin:     General: Skin is warm and dry.      Capillary Refill: Capillary refill takes less than 2 seconds.   Neurological:      General: No focal deficit present.      Mental Status: He is alert and oriented to person, place, and time.   Psychiatric:         Mood and Affect: Mood normal.         Behavior: Behavior normal.          Last Recorded Vitals  Blood pressure 106/58, pulse 65, temperature 37.2 °C (99 °F), temperature source Oral, resp. rate 15, height 1.829 m (6'), weight 85.1 kg (187 lb 9.8 oz), SpO2 97 %.    Relevant Results  Results for orders placed or performed during the hospital encounter of 01/02/24 (from the past 24 hour(s))   CBC   Result Value Ref Range    WBC 7.4 4.4 - 11.3 x10*3/uL    nRBC 0.0 0.0 - 0.0 /100 WBCs    RBC 3.80 (L) 4.50 - 5.90 x10*6/uL    Hemoglobin 11.7 (L) 13.5 - 17.5 g/dL    Hematocrit 35.6 (L) 41.0 - 52.0 %    MCV 94 80 - 100 fL    MCH 30.8 26.0 - 34.0 pg    MCHC 32.9 32.0 - 36.0 g/dL    RDW 12.0 11.5 - 14.5 %    Platelets 195 150 - 450 x10*3/uL   Basic metabolic panel   Result Value Ref Range    Glucose  123 (H) 65 - 99 mg/dL    Sodium 135 133 - 145 mmol/L    Potassium 4.7 3.4 - 5.1 mmol/L    Chloride 101 97 - 107 mmol/L    Bicarbonate 24 24 - 31 mmol/L    Urea Nitrogen 25 8 - 25 mg/dL    Creatinine 1.20 0.40 - 1.60 mg/dL    eGFR 66 >60 mL/min/1.73m*2    Calcium 8.9 8.5 - 10.4 mg/dL    Anion Gap 10 <=19 mmol/L     XR knee left 1-2 views    Result Date: 1/2/2024  Interpreted By:  Trent Dodge, STUDY: XR KNEE LEFT 1-2 VIEWS   INDICATION: Signs/Symptoms:Post-op knee   COMPARISON: None available.   ACCESSION NUMBER(S): [id number]   ORDERING CLINICIAN: JOSE JUARES   TECHNIQUE: Views: AP and lateral   FINDINGS: There is no evidence for fracture or subluxation. Postsurgical changes from left total knee arthroplasty with patellar resurfacing is noted.. Soft tissue gas is noted inferior to the patella. Surgical drains are noted posterior to the patella and lateral to the medial femoral condyle.       Postsurgical changes from left total knee arthroplasty.     Signed by: Trent Dodge 1/2/2024 12:12 PM Dictation workstation:   [id number]        Assessment/Plan     Principal Problem:    Arthritis of left knee   S/P left total knee arthroplasty    Pain management per primary service    DVT prophylaxis per primary service    PT/OT    Plan for discharge to home later today   Active Problems:    GERD (gastroesophageal reflux disease)   Continue PPI    Hypercholesterolemia   Continue statin     Hypertension   Hold valsartan until lightheadedness with standing resolved    Continue amlodipine    Follow up with PCP and cardiologist as instructed

## 2024-01-03 NOTE — PROGRESS NOTES
Physical Therapy    Physical Therapy Evaluation & Treatment    Patient Name: Fer Kasper  MRN: [id number]  Today's Date: 1/3/2024   Time Calculation  Start Time: 0905  Stop Time: 0938  Time Calculation (min): 33 min    Assessment/Plan   PT Assessment  PT Assessment Results: Decreased strength, Decreased range of motion, Decreased mobility, Pain  Rehab Prognosis: Good  Strengths: Support and attitude of living partners, Housing layout  Assessment Comment: Pt presented with decreased muscular strength/endurance, impaired L knee AROM, and increaesd assist required for functional mobility. He would benefit from continued skilled PT services prior to and after discharge to maximize independence and safety with functional mobility in his home environment.  End of Session Patient Position: Bed, 2 rail up, Alarm off, not on at start of session (call light and needs within reach; spouse in room)    IP OR SWING BED PT PLAN  Inpatient or Swing Bed: Inpatient    PT Plan  Treatment/Interventions: Bed mobility, Transfer training, Gait training, Stair training, Strengthening, Range of motion, Therapeutic exercise, Therapeutic activity  PT Plan: Skilled PT  PT Frequency: BID  PT Discharge Recommendations: Low intensity level of continued care  Equipment Recommended upon Discharge: Wheeled walker  PT Recommended Transfer Status: Stand by assist  PT - OK to Discharge: Yes (with continued skilled PT services at next level of care)      Subjective     General Visit Information:  General  Reason for Referral: impaired functional mobility. This 68 year old male was admitted for a planned surgery and underwent L TKA on 1/2/23.  Referred By: Landry Agustin MD  Past Medical History Relevant to Rehab: HTN, GERD, cervicalgia, R boutonniere deformity, brachial neuritis  Family/Caregiver Present: Yes (spouse)  Prior to Session Communication: Bedside nurse (RN cleared pt for participation.)  Patient Position Received: Up in chair  General  Comment: Pt agreed to session and participated fully.    Home Living:  Home Living  Type of Home: House  Lives With: Spouse  Home Adaptive Equipment: Cane  Home Layout: One level  Home Access: Stairs to enter without rails  Entrance Stairs-Rails: None  Entrance Stairs-Number of Steps: 2  Bathroom Shower/Tub: Walk-in shower  Bathroom Equipment: Grab bars in shower, Shower chair with back    Prior Level of Function:  Prior Function Per Pt/Caregiver Report  Level of [place]: Independent with ADLs and functional transfers, Independent with homemaking with ambulation  Ambulatory Assistance: Independent (including stairs. Denied any falls in the past year.)  Vocational: Retired  Hand Dominance: Right  Prior Function Comments: (+) driving    Precautions:  Precautions  Hearing/Visual Limitations: reading glasses; hearing WFL  Medical Precautions: Fall precautions  Post-Surgical Precautions: Left total knee precautions (instructed in precautions)    Objective   Pain:  Pain Assessment  Pain Assessment: 0-10  Pain Score: 5 - Moderate pain  Pain Type: Surgical pain  Pain Location: Knee  Pain Orientation: Left  Pain Interventions: Ambulation/increased activity  Cognition:  Cognition  Overall Cognitive Status: Within Functional Limits (A&O x4)    General Assessments:  Activity Tolerance  Endurance: Endurance does not limit participation in activity    Sensation  Light Touch: Not tested  Sensation Comment: denied paresthesias    Strength  Strength Comments: BLE grossly >/=3+/5 except L quad >/=3/5  Strength  Strength Comments: BLE grossly >/=3+/5 except L quad >/=3/5    Perception  Motor Planning: Appears intact    Coordination  Movements are Fluid and Coordinated: Yes    Postural Control  Postural Control: Within Functional Limits  Posture Comment: fwd head posture    Functional Assessments:  Bed Mobility  Bed Mobility: Yes  Bed Mobility 1  Bed Mobility 1: Sitting to supine  Level of Assistance 1: Modified independent (using  leg  to elevate LLE)  Bed Mobility Comments 1: HOB elevated, toward R side    Transfers  Transfer: Yes  Transfer 1  Technique 1: Sit to stand  Transfer Device 1: Walker  Transfer Level of Assistance 1: Close supervision  Trials/Comments 1: x2 trials from bedside chair  Transfers 2  Technique 2: Stand to sit  Transfer Device 2: Walker  Transfer Level of Assistance 2: Close supervision  Trials/Comments 2: x1 trial at bedside chair & x1 trial at EOB    Ambulation/Gait Training  Ambulation/Gait Training Performed: Yes  Ambulation/Gait Training 1  Surface 1: Level tile  Device 1: Rolling walker  Assistance 1: Close supervision  Comments/Distance (ft) 1: 100 ft using minimal step through pattern, slowed velocity, and discontinuous movement. Steady gait.    Extremity/Trunk Assessments:  RLE   RLE : Within Functional Limits  LLE   LLE : Within Functional Limits (except L knee ~5°-70°)    Treatments:  Therapeutic Exercise  Therapeutic Exercise Performed: Yes (x20 each. HEP hand out issued. Cued as needed for proper form to obtain maximal benefits.)  Therapeutic Exercise Activity 1: bilat DF/PF  Therapeutic Exercise Activity 2: bilat QS  Therapeutic Exercise Activity 3: L knee flex (supine)  Therapeutic Exercise Activity 4: L SAQ  Therapeutic Exercise Activity 5: L LAQ  Therapeutic Exercise Activity 6: L knee flex (seated)    Outcome Measures:  [facility] Basic Mobility  Turning from your back to your side while in a flat bed without using bedrails: A little  Moving from lying on your back to sitting on the side of a flat bed without using bedrails: A little  Moving to and from bed to chair (including a wheelchair): A little  Standing up from a chair using your arms (e.g. wheelchair or bedside chair): A little  To walk in hospital room: A little  Climbing 3-5 steps with railing: A little  Basic Mobility - Total Score: 18    Encounter Problems       Encounter Problems (Active)       Mobility       Perform functional mobility  to and from the bathroom with close supervision and 2ww.  (Progressing)       Start:  01/03/24    Expected End:  02/03/24               Mobility       STG - Patient will ambulate >/=150 ft with FWW at mod I level.       Start:  01/03/24    Expected End:  01/18/24            STG - Patient will ascend and descend 2 stairs with straight cane and Leticia x1.       Start:  01/03/24    Expected End:  01/18/24               Pain - Adult          Transfers       LTG - Patient will perform all functional transfers independent with 2ww (Progressing)       Start:  01/03/24    Expected End:  02/03/24               Transfers       STG - Patient to transfer supine<>sit at mod I level at flat bed.       Start:  01/03/24    Expected End:  01/18/24            STG - Patient will transfer sit<>stand with FWW at mod I level.       Start:  01/03/24    Expected End:  01/18/24                   Education Documentation  Handouts, taught by Morelia Jacobsen PT at 1/3/2024  9:56 AM.  Learner: Patient  Readiness: Eager  Method: Explanation, Handout  Response: Verbalizes Understanding, Demonstrated Understanding, Needs Reinforcement    Precautions, taught by Morelia Jacobsen PT at 1/3/2024  9:56 AM.  Learner: Patient  Readiness: Eager  Method: Explanation, Handout  Response: Verbalizes Understanding, Demonstrated Understanding, Needs Reinforcement    Home Exercise Program, taught by Morelia Jacobsen PT at 1/3/2024  9:56 AM.  Learner: Patient  Readiness: Eager  Method: Explanation, Handout  Response: Verbalizes Understanding, Demonstrated Understanding, Needs Reinforcement    Mobility Training, taught by Morelia Jacobsen PT at 1/3/2024  9:56 AM.  Learner: Patient  Readiness: Eager  Method: Explanation, Handout  Response: Verbalizes Understanding, Demonstrated Understanding, Needs Reinforcement    Education Comments  No comments found.

## 2024-01-03 NOTE — ANESTHESIA POSTPROCEDURE EVALUATION
Patient: Fer Kasper    Procedure Summary       Date: 01/02/24 Room / Location: [facility] OR 08 / Virtual EV OR    Anesthesia Start: 0742 Anesthesia Stop: 1012    Procedure: Arthroplasty Total Knee (Left: Knee) Diagnosis:       Primary osteoarthritis of left knee      (LEFT KNEE OSTEOARTHRITIS)    Surgeons: Landry Agustin MD Responsible Provider: Antony Enciso MD    Anesthesia Type: general, regional ASA Status: 2            Anesthesia Type: general, regional    Vitals Value Taken Time   /73 01/02/24 1530   Temp 36.2 °C (97.2 °F) 01/02/24 1008   Pulse 77 01/02/24 1530   Resp 18 01/02/24 1530   SpO2 97 % 01/02/24 1530       Anesthesia Post Evaluation    Patient location during evaluation: PACU  Patient participation: complete - patient participated  Level of consciousness: awake  Pain management: adequate  Airway patency: patent  Cardiovascular status: acceptable  Respiratory status: acceptable  Hydration status: acceptable  Postoperative Nausea and Vomiting: none        There were no known notable events for this encounter.

## 2024-01-03 NOTE — PROGRESS NOTES
[facility] met with the pt and wife at bedside. Pt is pod#1 left total knee replacement. Pt is agreeable for [facility],  Homecare as first choice however this is outside their service area. Pt and wife ok with referrals to alternate agencies. Referrals sent to [facility], [facility], [facility] and [facility], await response.     Pt should not dc until [facility] confirmed to be arranged.      01/03/24 1001   Discharge Planning   Patient expects to be discharged to: [facility] - referrals sent, await response on accepting agency

## 2024-01-03 NOTE — PROGRESS NOTES
Occupational Therapy    Evaluation/Treatment    Patient Name: Fer Kasper  MRN: [id number]  : 1955  Today's Date: 24  Time Calculation  Start Time: 0810  Stop Time: 0845  Time Calculation (min): 35 min       Assessment:  OT Assessment: Referral received, chart reviewed, and evaluation complete.  Patient presents for elective joint surgery s/p left TKA.  Decreased LB ADls, mobility, and transfer ability.  Recommend low intensity rehab upon discharge.  Prognosis: Good  Barriers to Discharge: None  Evaluation/Treatment Tolerance: Patient tolerated treatment well  Medical Staff Made Aware: Yes  End of Session Communication: Bedside nurse  End of Session Patient Position: Up in chair    Plan:  Treatment Interventions: ADL retraining, Functional transfer training, Patient/family training  OT Frequency: 4 times per week  OT Discharge Recommendations: Low intensity level of continued care  Equipment Recommended upon Discharge: Wheeled walker  OT Recommended Transfer Status: Assist of 1  OT - OK to Discharge: Yes  Treatment Interventions: ADL retraining, Functional transfer training, Patient/family training    Subjective   Current Problem:  1. Arthritis of left knee  Walker rolling    aspirin 81 mg EC tablet    docusate sodium (Colace) 100 mg capsule    acetaminophen (Tylenol) 500 mg tablet    oxyCODONE (Roxicodone) 5 mg immediate release tablet    ibuprofen 600 mg tablet    Referral to Home Health    Walker rolling        General:   OT Received On: 24  General  Reason for Referral: decreased functional status  Referred By: Landry Agustin MD  Past Medical History Relevant to Rehab: Left TKA, HTN, HLD  Family/Caregiver Present: Yes  Caregiver Feedback: Spouse was present and took notes of the sequence of dressing, and precautions  Prior to Session Communication: Bedside nurse  Patient Position Received: Bed, 3 rail up  General Comment: 68 year old WM admitted for elective joint surgery.  He is s/p left  TKA on 1/2/24    Precautions:  Hearing/Visual Limitations: hearing wfl. wears glasses for reading  Medical Precautions: Fall precautions (joint replacement precautions for knee)  Post-Surgical Precautions: Left total knee precautions  Precautions Comment: instructed patient/caregiver on surgical knee precautions and provided hand out     Pain:  Pain Assessment  Pain Assessment: 0-10  Pain Score: 7  Pain Type: Surgical pain  Pain Location: Knee  Pain Orientation: Left    Objective   Cognition:  Overall Cognitive Status: Within Functional Limits  Orientation Level: Oriented X4      Home Living:  Type of Home: House  Lives With: Spouse  Home Layout: One level  Home Access: Stairs to enter without rails  Entrance Stairs-Rails: None  Entrance Stairs-Number of Steps: 2  Bathroom Shower/Tub: Tub/shower unit, Walk-in shower  Bathroom Toilet: Standard    Prior Function:  Level of [place]: Independent with ADLs and functional transfers, Independent with homemaking with ambulation  Vocational: Retired  Hand Dominance: Right    IADL History:  Homemaking Responsibilities: Yes  Current License: Yes  Mode of Transportation: Car    ADL:  Eating Assistance: Independent  Grooming Assistance: Independent  Bathing Assistance: Moderate  UE Dressing Assistance: Independent  LE Dressing Assistance: Moderate  Toileting Assistance with Device: Minimal    Activities of Daily Living: Feeding  Feeding Level of Assistance: Independent  Feeding Where Assessed: Bed level    LE Dressing  LE Dressing: Yes  Pants Level of Assistance: Moderate assistance  Sock Level of Assistance: Moderate assistance  LE Dressing Where Assessed: Chair  LE Dressing Comments: instructed in AE sock aide, reacher, and dressing stick    Toileting  Toileting Level of Assistance: Minimum assistance  Where Assessed: Toilet  Toileting Comments: with 2ww    Activity Tolerance:  Endurance: Endurance does not limit participation in activity       Bed Mobility/Transfers: Bed  Mobility  Bed Mobility: Yes  Bed Mobility 1  Bed Mobility 1: Supine to sitting  Level of Assistance 1: Modified independent    Transfers  Transfer: Yes  Transfer 1  Transfer From 1: Bed to  Transfer to 1: Chair with arms  Technique 1: Sit to stand  Transfer Device 1: Walker  Transfer Level of Assistance 1: Minimum assistance  Transfers 2  Transfer From 2: Stand to  Transfer to 2: Toilet  Technique 2: Stand to sit  Transfer Device 2: Walker  Transfer Level of Assistance 2: Minimum assistance       Car Transfers  Car Transfers Comments: simulated car transfer    Sitting Balance:  Static Sitting Balance  Static Sitting-Balance Support: Feet supported  Static Sitting-Level of Assistance: Independent    Therapy/Activity:  performed functional mobility to and from the bathroom with min-mod assist and 2ww    Sensation:  Light Touch: No apparent deficits    Strength:  Strength Comments: BUE ~3+/5     Coordination:  Movements are Fluid and Coordinated: Yes     Hand Function:  Hand Function  Gross Grasp: Functional  Coordination: Functional    Outcome Measures: [facility] Daily Activity  Putting on and taking off regular lower body clothing: A lot  Bathing (including washing, rinsing, drying): A little  Putting on and taking off regular upper body clothing: None  Toileting, which includes using toilet, bedpan or urinal: A little  Taking care of personal grooming such as brushing teeth: None  Eating Meals: None  Daily Activity - Total Score: 20    Goals:         Problem: Bathing  Goal: STG - Patient will bathe body lower body independent with AE   Outcome: Progressing     Problem: Dressings Lower Extremities  Goal: LTG - Patient will dress lower body independent with AE  Outcome: Progressing     Problem: Mobility  Goal: Perform functional mobility to and from the bathroom with close supervision and 2ww.   Outcome: Progressing     Problem: Transfers  Goal: LTG - Patient will perform all functional transfers independent with  2ww  Outcome: Progressing     Problem: Therapeutic Acitivity  Goal: patient will implement surgical precautions independently during ADLs and mobility  Outcome: Progressing

## 2024-01-03 NOTE — PROGRESS NOTES
Update: Pt has been accepted to [facility] [place], this worker made pt and his wife aware, they are agreeable.      01/03/24 1109   Discharge Planning   Patient expects to be discharged to: [facility] [place]   Does the patient need discharge transport arranged? No

## 2024-01-03 NOTE — PROGRESS NOTES
Spiritual Care Visit    Clinical Encounter Type  Visited With: Patient and family together  Routine Visit: Introduction  Continue Visiting: Yes         Values/Beliefs  Spiritual Requests During Hospitalization: Anointing & Communion today    Sacramental Encounters  Communion: Patient wants communion  Communion Given Indicator: Yes  Sacrament of Sick-Anointing: Anointed     Efren Willson

## 2024-01-03 NOTE — ANESTHESIA PROCEDURE NOTES
Peripheral Block    Patient location during procedure: pre-op  Start time: 1/2/2024 7:33 AM  End time: 1/2/2024 7:34 AM  Reason for block: at surgeon's request and post-op pain management  Staffing  Performed: attending   Authorized by: Antony Enciso MD    Performed by: Antony Enciso MD  Preanesthetic Checklist  Completed: patient identified, IV checked, site marked, risks and benefits discussed, surgical consent, monitors and equipment checked, pre-op evaluation and timeout performed   Timeout performed at: 1/2/2024 7:20 AM  Peripheral Block  Patient position: laying flat  Prep: ChloraPrep  Patient monitoring: heart rate, cardiac monitor and continuous pulse ox  Block type: adductor canal  Laterality: left  Injection technique: single-shot  Guidance: ultrasound guided  Local infiltration: ropivacaine  Infiltration strength: 0.5 %  Dose: 20 mL  Needle  Needle type: short-bevel   Needle gauge: 21 G  Needle length: 10 cm  Needle localization: ultrasound guidance  Assessment  Injection assessment: negative aspiration for heme, no paresthesia on injection, incremental injection and local visualized surrounding nerve on ultrasound  Paresthesia pain: none  Heart rate change: no  Slow fractionated injection: yes

## 2024-01-03 NOTE — PROGRESS NOTES
Fer Kasper is a 68 y.o. male on day 0 of admission presenting with Arthritis of left knee.    Subjective   Starting to work with therapy.  In a bedside chair.  Denies chest pain or shortness of breath.  Pain is controlled.  Tolerating p.o. intake.  Wants to go home today.  Wife is at the bedside.       Objective     Physical Exam  Alert and oriented x 3  Left lower extremity: Drain output noted.  Dressing dry.  Able to straight leg raise.  Compartments are soft throughout the thigh and calf.  Distally neurovascularly intact with active ankle dorsiflexion plantarflexion.    Last Recorded Vitals  Blood pressure 106/58, pulse 65, temperature 37.2 °C (99 °F), temperature source Oral, resp. rate 15, height 1.829 m (6'), weight 85.1 kg (187 lb 9.8 oz), SpO2 97 %.      Relevant Results  Reviewed postop x-ray: Stable positioning of the cemented left total knee replacement.    Results for orders placed or performed during the hospital encounter of 01/02/24 (from the past 24 hour(s))   CBC   Result Value Ref Range    WBC 7.4 4.4 - 11.3 x10*3/uL    nRBC 0.0 0.0 - 0.0 /100 WBCs    RBC 3.80 (L) 4.50 - 5.90 x10*6/uL    Hemoglobin 11.7 (L) 13.5 - 17.5 g/dL    Hematocrit 35.6 (L) 41.0 - 52.0 %    MCV 94 80 - 100 fL    MCH 30.8 26.0 - 34.0 pg    MCHC 32.9 32.0 - 36.0 g/dL    RDW 12.0 11.5 - 14.5 %    Platelets 195 150 - 450 x10*3/uL   Basic metabolic panel   Result Value Ref Range    Glucose 123 (H) 65 - 99 mg/dL    Sodium 135 133 - 145 mmol/L    Potassium 4.7 3.4 - 5.1 mmol/L    Chloride 101 97 - 107 mmol/L    Bicarbonate 24 24 - 31 mmol/L    Urea Nitrogen 25 8 - 25 mg/dL    Creatinine 1.20 0.40 - 1.60 mg/dL    eGFR 66 >60 mL/min/1.73m*2    Calcium 8.9 8.5 - 10.4 mg/dL    Anion Gap 10 <=19 mmol/L       Assessment/Plan   Principal Problem:    Arthritis of left knee      Problem List:  Left total knee replacement: Postoperative day 1.  Mobilize physical therapy weightbearing as tolerated.  Plan for home with home health care  DVT  risk: Aspirin twice daily        Landry Agustin MD

## 2024-01-03 NOTE — CARE PLAN
The patient's goals for the shift include  comfort.    The clinical goals for the shift include pain will be controled overnight.

## 2024-01-05 ENCOUNTER — PROCEDURE VISIT (OUTPATIENT)
Dept: ORTHOPEDIC SURGERY | Facility: CLINIC | Age: 69
End: 2024-01-05
Payer: MEDICARE

## 2024-01-05 PROCEDURE — E0143 WALKER FOLDING WHEELED W/O S: HCPCS | Performed by: ORTHOPAEDIC SURGERY

## 2024-07-08 DIAGNOSIS — K21.9 GASTROESOPHAGEAL REFLUX DISEASE WITHOUT ESOPHAGITIS: ICD-10-CM

## 2024-07-08 RX ORDER — OMEPRAZOLE 20 MG/1
CAPSULE, DELAYED RELEASE ORAL
Qty: 60 CAPSULE | Refills: 3 | Status: SHIPPED | OUTPATIENT
Start: 2024-07-08

## 2024-08-09 ENCOUNTER — APPOINTMENT (OUTPATIENT)
Dept: PRIMARY CARE | Facility: CLINIC | Age: 69
End: 2024-08-09
Payer: MEDICARE

## 2024-08-09 ENCOUNTER — LAB (OUTPATIENT)
Dept: LAB | Facility: LAB | Age: 69
End: 2024-08-09
Payer: MEDICARE

## 2024-08-09 VITALS
DIASTOLIC BLOOD PRESSURE: 72 MMHG | HEIGHT: 72 IN | WEIGHT: 186.4 LBS | BODY MASS INDEX: 25.25 KG/M2 | HEART RATE: 77 BPM | RESPIRATION RATE: 16 BRPM | TEMPERATURE: 97.6 F | SYSTOLIC BLOOD PRESSURE: 132 MMHG

## 2024-08-09 DIAGNOSIS — Z12.5 SCREENING FOR PROSTATE CANCER: ICD-10-CM

## 2024-08-09 DIAGNOSIS — Z00.00 MEDICARE ANNUAL WELLNESS VISIT, INITIAL: ICD-10-CM

## 2024-08-09 DIAGNOSIS — K21.9 GASTROESOPHAGEAL REFLUX DISEASE WITHOUT ESOPHAGITIS: ICD-10-CM

## 2024-08-09 DIAGNOSIS — Z12.11 COLON CANCER SCREENING: ICD-10-CM

## 2024-08-09 DIAGNOSIS — I10 PRIMARY HYPERTENSION: ICD-10-CM

## 2024-08-09 DIAGNOSIS — I10 PRIMARY HYPERTENSION: Primary | ICD-10-CM

## 2024-08-09 DIAGNOSIS — Z79.899 DRUG THERAPY: ICD-10-CM

## 2024-08-09 DIAGNOSIS — E78.00 HYPERCHOLESTEROLEMIA: ICD-10-CM

## 2024-08-09 PROBLEM — M75.100 TORN ROTATOR CUFF: Status: ACTIVE | Noted: 2024-08-09

## 2024-08-09 PROBLEM — M17.12 ARTHRITIS OF LEFT KNEE: Status: ACTIVE | Noted: 2023-08-02

## 2024-08-09 LAB
ALBUMIN SERPL BCP-MCNC: 4.6 G/DL (ref 3.4–5)
ALP SERPL-CCNC: 60 U/L (ref 33–136)
ALT SERPL W P-5'-P-CCNC: 28 U/L (ref 10–52)
ANION GAP SERPL CALC-SCNC: 13 MMOL/L (ref 10–20)
AST SERPL W P-5'-P-CCNC: 36 U/L (ref 9–39)
BILIRUB SERPL-MCNC: 0.8 MG/DL (ref 0–1.2)
BUN SERPL-MCNC: 32 MG/DL (ref 6–23)
CALCIUM SERPL-MCNC: 9.6 MG/DL (ref 8.6–10.3)
CHLORIDE SERPL-SCNC: 105 MMOL/L (ref 98–107)
CHOLEST SERPL-MCNC: 186 MG/DL (ref 0–199)
CHOLESTEROL/HDL RATIO: 1.9
CO2 SERPL-SCNC: 24 MMOL/L (ref 21–32)
CREAT SERPL-MCNC: 1.45 MG/DL (ref 0.5–1.3)
EGFRCR SERPLBLD CKD-EPI 2021: 52 ML/MIN/1.73M*2
ERYTHROCYTE [DISTWIDTH] IN BLOOD BY AUTOMATED COUNT: 12.4 % (ref 11.5–14.5)
GLUCOSE SERPL-MCNC: 100 MG/DL (ref 74–99)
HCT VFR BLD AUTO: 39.3 % (ref 41–52)
HDLC SERPL-MCNC: 100.4 MG/DL
HGB BLD-MCNC: 12.7 G/DL (ref 13.5–17.5)
LDLC SERPL CALC-MCNC: 77 MG/DL
MAGNESIUM SERPL-MCNC: 2.18 MG/DL (ref 1.6–2.4)
MCH RBC QN AUTO: 32.2 PG (ref 26–34)
MCHC RBC AUTO-ENTMCNC: 32.3 G/DL (ref 32–36)
MCV RBC AUTO: 100 FL (ref 80–100)
NON HDL CHOLESTEROL: 86 MG/DL (ref 0–149)
NRBC BLD-RTO: 0 /100 WBCS (ref 0–0)
PLATELET # BLD AUTO: 264 X10*3/UL (ref 150–450)
POTASSIUM SERPL-SCNC: 5.3 MMOL/L (ref 3.5–5.3)
PROT SERPL-MCNC: 7.4 G/DL (ref 6.4–8.2)
PSA SERPL-MCNC: 3.36 NG/ML
RBC # BLD AUTO: 3.95 X10*6/UL (ref 4.5–5.9)
SODIUM SERPL-SCNC: 137 MMOL/L (ref 136–145)
TRIGL SERPL-MCNC: 45 MG/DL (ref 0–149)
VLDL: 9 MG/DL (ref 0–40)
WBC # BLD AUTO: 5.8 X10*3/UL (ref 4.4–11.3)

## 2024-08-09 PROCEDURE — 3075F SYST BP GE 130 - 139MM HG: CPT | Performed by: FAMILY MEDICINE

## 2024-08-09 PROCEDURE — 1159F MED LIST DOCD IN RCRD: CPT | Performed by: FAMILY MEDICINE

## 2024-08-09 PROCEDURE — 99214 OFFICE O/P EST MOD 30 MIN: CPT | Performed by: FAMILY MEDICINE

## 2024-08-09 PROCEDURE — 80053 COMPREHEN METABOLIC PANEL: CPT

## 2024-08-09 PROCEDURE — 80061 LIPID PANEL: CPT

## 2024-08-09 PROCEDURE — 3008F BODY MASS INDEX DOCD: CPT | Performed by: FAMILY MEDICINE

## 2024-08-09 PROCEDURE — 1170F FXNL STATUS ASSESSED: CPT | Performed by: FAMILY MEDICINE

## 2024-08-09 PROCEDURE — G0446 INTENS BEHAVE THER CARDIO DX: HCPCS | Performed by: FAMILY MEDICINE

## 2024-08-09 PROCEDURE — 85027 COMPLETE CBC AUTOMATED: CPT

## 2024-08-09 PROCEDURE — 3078F DIAST BP <80 MM HG: CPT | Performed by: FAMILY MEDICINE

## 2024-08-09 PROCEDURE — G0103 PSA SCREENING: HCPCS

## 2024-08-09 PROCEDURE — 36415 COLL VENOUS BLD VENIPUNCTURE: CPT

## 2024-08-09 PROCEDURE — 1036F TOBACCO NON-USER: CPT | Performed by: FAMILY MEDICINE

## 2024-08-09 PROCEDURE — G0439 PPPS, SUBSEQ VISIT: HCPCS | Performed by: FAMILY MEDICINE

## 2024-08-09 PROCEDURE — 83735 ASSAY OF MAGNESIUM: CPT

## 2024-08-09 PROCEDURE — 1160F RVW MEDS BY RX/DR IN RCRD: CPT | Performed by: FAMILY MEDICINE

## 2024-08-09 RX ORDER — ROSUVASTATIN CALCIUM 20 MG/1
20 TABLET, COATED ORAL DAILY
Qty: 90 TABLET | Refills: 3 | Status: SHIPPED | OUTPATIENT
Start: 2024-08-09 | End: 2025-08-09

## 2024-08-09 ASSESSMENT — ACTIVITIES OF DAILY LIVING (ADL)
DRESSING: INDEPENDENT
TAKING_MEDICATION: INDEPENDENT
MANAGING_FINANCES: INDEPENDENT
DOING_HOUSEWORK: INDEPENDENT
GROCERY_SHOPPING: INDEPENDENT
BATHING: INDEPENDENT

## 2024-08-09 ASSESSMENT — ENCOUNTER SYMPTOMS
DEPRESSION: 0
OCCASIONAL FEELINGS OF UNSTEADINESS: 0
LOSS OF SENSATION IN FEET: 0

## 2024-08-09 NOTE — ASSESSMENT & PLAN NOTE
8/9/24 Subsequent MEDICARE Wellness EVAL -- former smoker -- no current tobacco (quit > 30 y ago), no dementia, no depression. Colonoscopy clean 2014 (recheck 2024). Tdap 2018. (next booster due 2028). Encouraged healthy diet and regular exercise. Works at [employer]. Moderate alcohol intake (2-3 glasses wine per night, more on weekend) -- will follow liver function.  Pneumovax 8/12/2020. Vaccines up to date -- Prevnar 20 8/5/2022.   Last covid booster 10/11/2022.     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     8/9/24 I spent 15 minutes face-to-face with this individual discussing their cardiovascular risk and behavioral therapies of nutritional choices, exercise, and elimination of habits contributing to risk.  We agreed on a plan addressing reduction of their current cardiovascular risk.  Patient's 10 year CV risk estimate calculates to:   18% ()   4/28/14 Colonoscopy and EGD per Andrew      # Hx Lumbar spondylosis/herniated disc -- improved with core strenghthening per DR Marie (PAIN 12/9/2020). Never needed prednisone burst.      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   # hx Abnormal EKG -- normal stress testing 4/26/2016. New LAFB with LAD 2022. Asymptomatic.   # Hx Chalazion -- left eyelid -- resolved.

## 2024-08-09 NOTE — ASSESSMENT & PLAN NOTE
Stable on amlodipine to10 mg daily.     Continue current medications, healthy diet and regular exercise.    Well controlled at [place] blood donation.     Should take amlodipine on day of surgery.  OK to hold low dose aspirin for 1 week prior to surgery.  Then restart the day after.    Taking low dose aspirin to lower ASCVD risk.

## 2024-08-09 NOTE — PROGRESS NOTES
Subjective   Reason for Visit: Fer Kasper is an 69 y.o. male here for a Medicare Wellness visit.     Blood pressures running well at home.     Past Medical, Surgical, and Family History reviewed and updated in chart.    Reviewed all medications by prescribing practitioner or clinical pharmacist (such as prescriptions, OTCs, herbal therapies and supplements) and documented in the medical record.        Patient Care Team:  Watson Carmichael MD as PCP - General       Objective   Vitals:  /72 (BP Location: Left arm, Patient Position: Sitting, BP Cuff Size: Adult)   Pulse 77   Temp 36.4 °C (97.6 °F) (Temporal)   Resp 16   Ht 1.829 m (6')   Wt 84.6 kg (186 lb 6.4 oz)   BMI 25.28 kg/m²       O: VSS AFEB Awake, Alert, NAD.  No intoxication, withdrawal, or sedation.  Chest CTA.  Heart RRR.  Ext no c/c/e.      Assessment/Plan   Problem List Items Addressed This Visit             ICD-10-CM    GERD (gastroesophageal reflux disease) K21.9     Restarted PPI -- omeprazole 20 mg daily.   under good control with apple cider vinegar capsules.    Monitor Mg         Hypercholesterolemia E78.00    Relevant Medications    rosuvastatin (Crestor) 20 mg tablet    Hypertension - Primary I10     Stable on amlodipine to10 mg daily.     Continue current medications, healthy diet and regular exercise.    Well controlled at [place] blood donation.     Should take amlodipine on day of surgery.  OK to hold low dose aspirin for 1 week prior to surgery.  Then restart the day after.    Taking low dose aspirin to lower ASCVD risk.          Relevant Orders    Lipid panel    Comprehensive metabolic panel    CBC    Medicare annual wellness visit, initial Z00.00     8/9/24 Subsequent MEDICARE Wellness EVAL -- former smoker -- no current tobacco (quit > 30 y ago), no dementia, no depression. Colonoscopy clean 2014 (recheck 2024). Tdap 2018. (next booster due 2028). Encouraged healthy diet and regular exercise. Works at [employer].  Moderate alcohol intake (2-3 glasses wine per night, more on weekend) -- will follow liver function.  Pneumovax 8/12/2020. Vaccines up to date -- Prevnar 20 8/5/2022.   Last covid booster 10/11/2022.     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     8/9/24 I spent 15 minutes face-to-face with this individual discussing their cardiovascular risk and behavioral therapies of nutritional choices, exercise, and elimination of habits contributing to risk.  We agreed on a plan addressing reduction of their current cardiovascular risk.  Patient's 10 year CV risk estimate calculates to:   18% ()   4/28/14 Colonoscopy and EGD per Andrew      # Hx Lumbar spondylosis/herniated disc -- improved with core strenghthening per DR aMrie (PAIN 12/9/2020). Never needed prednisone burst.      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   # hx Abnormal EKG -- normal stress testing 4/26/2016. New LAFB with LAD 2022. Asymptomatic.   # Hx Chalazion -- left eyelid -- resolved.              Other Visit Diagnoses         Codes    Colon cancer screening     Z12.11    Relevant Orders    Colonoscopy Screening; Average Risk Patient    Drug therapy     Z79.899    Relevant Orders    Magnesium    Screening for prostate cancer     Z12.5    Relevant Orders    Prostate Specific Antigen, Screen

## 2024-08-09 NOTE — ASSESSMENT & PLAN NOTE
Restarted PPI -- omeprazole 20 mg daily.   under good control with apple cider vinegar capsules.    Monitor Mg

## 2024-08-13 DIAGNOSIS — Z12.11 COLON CANCER SCREENING: ICD-10-CM

## 2024-08-13 RX ORDER — POLYETHYLENE GLYCOL 3350, SODIUM CHLORIDE, SODIUM BICARBONATE, POTASSIUM CHLORIDE 420; 11.2; 5.72; 1.48 G/4L; G/4L; G/4L; G/4L
4000 POWDER, FOR SOLUTION ORAL ONCE
Qty: 4000 ML | Refills: 0 | Status: SHIPPED | OUTPATIENT
Start: 2024-08-13 | End: 2024-08-13

## 2024-10-28 PROBLEM — M25.9 DISORDER OF SHOULDER: Status: ACTIVE | Noted: 2024-05-09

## 2024-10-28 PROBLEM — E66.3 OVERWEIGHT WITH BODY MASS INDEX (BMI) 25.0-29.9: Status: ACTIVE | Noted: 2024-10-28

## 2024-10-28 PROBLEM — M75.52 SUBACROMIAL BURSITIS OF LEFT SHOULDER JOINT: Status: ACTIVE | Noted: 2024-05-09

## 2024-10-28 PROBLEM — M25.569 KNEE PAIN: Status: ACTIVE | Noted: 2024-10-28

## 2024-10-28 PROBLEM — M75.20 BICEPS TENDINITIS: Status: ACTIVE | Noted: 2024-05-09

## 2024-11-06 ENCOUNTER — CLINICAL SUPPORT (OUTPATIENT)
Dept: PREADMISSION TESTING | Facility: HOSPITAL | Age: 69
End: 2024-11-06
Payer: MEDICARE

## 2024-11-06 RX ORDER — ASPIRIN 81 MG/1
81 TABLET ORAL DAILY
COMMUNITY

## 2024-11-06 NOTE — PREPROCEDURE INSTRUCTIONS

## 2024-11-13 ENCOUNTER — HOSPITAL ENCOUNTER (OUTPATIENT)
Dept: GASTROENTEROLOGY | Facility: HOSPITAL | Age: 69
Discharge: HOME | End: 2024-11-13
Payer: MEDICARE

## 2024-11-13 ENCOUNTER — ANESTHESIA EVENT (OUTPATIENT)
Dept: GASTROENTEROLOGY | Facility: HOSPITAL | Age: 69
End: 2024-11-13
Payer: MEDICARE

## 2024-11-13 ENCOUNTER — ANESTHESIA (OUTPATIENT)
Dept: GASTROENTEROLOGY | Facility: HOSPITAL | Age: 69
End: 2024-11-13
Payer: MEDICARE

## 2024-11-13 VITALS
BODY MASS INDEX: 23.71 KG/M2 | WEIGHT: 175.04 LBS | OXYGEN SATURATION: 98 % | HEART RATE: 72 BPM | HEIGHT: 72 IN | DIASTOLIC BLOOD PRESSURE: 73 MMHG | SYSTOLIC BLOOD PRESSURE: 127 MMHG | TEMPERATURE: 97 F | RESPIRATION RATE: 18 BRPM

## 2024-11-13 DIAGNOSIS — Z12.11 COLON CANCER SCREENING: ICD-10-CM

## 2024-11-13 PROCEDURE — 3700000002 HC GENERAL ANESTHESIA TIME - EACH INCREMENTAL 1 MINUTE

## 2024-11-13 PROCEDURE — 7100000009 HC PHASE TWO TIME - INITIAL BASE CHARGE

## 2024-11-13 PROCEDURE — 3700000001 HC GENERAL ANESTHESIA TIME - INITIAL BASE CHARGE

## 2024-11-13 PROCEDURE — 45378 DIAGNOSTIC COLONOSCOPY: CPT | Performed by: INTERNAL MEDICINE

## 2024-11-13 PROCEDURE — G0121 COLON CA SCRN NOT HI RSK IND: HCPCS | Performed by: INTERNAL MEDICINE

## 2024-11-13 PROCEDURE — 7100000010 HC PHASE TWO TIME - EACH INCREMENTAL 1 MINUTE

## 2024-11-13 PROCEDURE — 2500000004 HC RX 250 GENERAL PHARMACY W/ HCPCS (ALT 636 FOR OP/ED): Performed by: NURSE ANESTHETIST, CERTIFIED REGISTERED

## 2024-11-13 RX ORDER — PROPOFOL 10 MG/ML
INJECTION, EMULSION INTRAVENOUS AS NEEDED
Status: DISCONTINUED | OUTPATIENT
Start: 2024-11-13 | End: 2024-11-13

## 2024-11-13 RX ORDER — FAMOTIDINE 20 MG/1
20 TABLET, FILM COATED ORAL NIGHTLY
COMMUNITY

## 2024-11-13 ASSESSMENT — PAIN SCALES - GENERAL
PAINLEVEL_OUTOF10: 0 - NO PAIN
PAIN_LEVEL: 0
PAINLEVEL_OUTOF10: 0 - NO PAIN
PAINLEVEL_OUTOF10: 0 - NO PAIN

## 2024-11-13 ASSESSMENT — PAIN - FUNCTIONAL ASSESSMENT
PAIN_FUNCTIONAL_ASSESSMENT: 0-10

## 2024-11-13 ASSESSMENT — COLUMBIA-SUICIDE SEVERITY RATING SCALE - C-SSRS
1. IN THE PAST MONTH, HAVE YOU WISHED YOU WERE DEAD OR WISHED YOU COULD GO TO SLEEP AND NOT WAKE UP?: NO
6. HAVE YOU EVER DONE ANYTHING, STARTED TO DO ANYTHING, OR PREPARED TO DO ANYTHING TO END YOUR LIFE?: NO
2. HAVE YOU ACTUALLY HAD ANY THOUGHTS OF KILLING YOURSELF?: NO

## 2024-11-13 NOTE — Clinical Note
Patient tolerated procedure well. Appears comfortable with no complaints of pain. VS stable. Arousable prior to transport. Patient transported to [facility] via cart.  Report called per crna   . Handoff completed

## 2024-11-13 NOTE — H&P
Outpatient Hospital Procedure H&P    Patient Profile-Procedures  Initial Info  Patient Demographics  Name Fer Kasper  Date of Birth 1955  MRN [id number]  Address   [address] [place] [id number] [facility] [address]    Primary Phone Number [phone]  Secondary Phone Number    PCP Watson Carmichael    Procedure(s):  Colonoscopy    Primary contact name and number   Extended Emergency Contact Information  Primary Emergency Contact: Johana Kasper  Address: [address] [place], [place] [id number] [facility]  Home Phone: [phone]  Relation: Spouse    General Health  Weight   Vitals:    11/13/24 1000   Weight: 79.4 kg (175 lb 0.7 oz)     BMI Body mass index is 23.74 kg/m².    Allergies  No Known Allergies    Past Medical History   Past Medical History:   Diagnosis Date    Abnormal EKG 08/02/2023    Boutonniere deformity of finger of right hand 08/02/2023    Brachial neuritis or radiculitis 12/09/2010    Cervicalgia 12/09/2010    GERD (gastroesophageal reflux disease)     Hyperlipidemia     Hypertension     Kidney stones     Other long term (current) drug therapy 08/10/2016    Use of proton pump inhibitor therapy    Pain in joint, shoulder region 12/09/2010    Personal history of other specified conditions 02/27/2014    History of abdominal pain       Provider assessment  Diagnosis: Screening    Medication Reviewed - yes  Prior to Admission medications    Medication Sig Start Date End Date Taking? Authorizing Provider   amLODIPine (Norvasc) 10 mg tablet TAKE ONE TABLET BY MOUTH DAILY 11/13/23  Yes Watson Carmichael MD   aspirin 81 mg EC tablet Take 1 tablet (81 mg) by mouth once daily.   Yes Historical Provider, MD   famotidine (Pepcid) 20 mg tablet Take 1 tablet (20 mg) by mouth once daily at bedtime.   Yes Historical Provider, MD   rosuvastatin (Crestor) 20 mg tablet Take 1 tablet (20 mg) by mouth once daily. 8/9/24 8/9/25 Yes Watson ROQUE  MD Amol   omeprazole (PriLOSEC) 20 mg DR capsule TAKE 1 CAPSULE BY MOUTH DAILY *DO NOT CRUSH OR CHEW* 7/8/24   Watson Carmichael MD       Physical Exam  Vitals:    11/13/24 1000   BP: 146/77   Pulse: 78   Resp: 16   Temp: 36.4 °C (97.5 °F)   SpO2: 99%        General: A&Ox3, NAD.  CV: RRR. No murmur.  Resp: CTA bilaterally. No wheezing, rhonchi or rales.   Extrem: No edema.       Oropharyngeal Classification II (hard and soft palate, upper portion of tonsils and uvula visible)  ASA PS Classification 2  Sedation Plan Deep  Procedure Plan - pre-procedural (re)assesment completed by physician:  discharge/transfer patient when discharge criteria met    Wilberto Johnson MD  11/13/2024 12:28 PM

## 2024-11-13 NOTE — DISCHARGE INSTRUCTIONS
Patient Instructions after a Colonoscopy      The anesthetics, sedatives or narcotics which were given to you today will be acting in your body for the next 24 hours, so you might feel a little sleepy or groggy.  This feeling should slowly wear off. Carefully read and follow the instructions.     You received sedation today:  - Do not drive or operate any machinery or power tools of any kind.   - No alcoholic beverages today, not even beer or wine.  - Do not make any important decisions or sign any legal documents.  - No over the counter medications that contain alcohol or that may cause drowsiness.  - Do not make any important decisions or sign any legal documents.    While it is common to experience mild to moderate abdominal distention, gas, or belching after your procedure, if any of these symptoms occur following discharge from the GI Lab or within one week of having your procedure, call the [facility] to be advised whether a visit to your nearest Urgent Care or Emergency Department is indicated.  Take this paper with you if you go.     - If you develop an allergic reaction to the medications that were given during your procedure such as difficulty breathing, rash, hives, severe nausea, vomiting or lightheadedness.  - If you experience chest pain, shortness of breath, severe abdominal pain, fevers and chills.  -If you develop signs and symptoms of bleeding such as blood in your spit, if your stools turn black, tarry, or bloody  - If you have not urinated within 8 hours following your procedure.  - If your IV site becomes painful, red, inflamed, or looks infected.    If you received a biopsy/polypectomy/sphincterotomy the following instructions apply below:    __ Do not use Aspirin containing products, non-steroidal medications or anti-coagulants for one week following your procedure. (Examples of these types of medications are: Advil, Arthrotec, Aleve, Coumadin, Ecotrin, Heparin, Ibuprofen,  Indocin, Motrin, Naprosyn, Nuprin, Plavix, Vioxx, and Voltarin, or their generic forms.  This list is not all-inclusive.  Check with your physician or pharmacist before resuming medications.)   __ Eat a soft diet today.  Avoid foods that are poorly digested for the next 24 hours.  These foods would include: nuts, beans, lettuce, red meats, and fried foods. Start with liquids and advance your diet as tolerated, gradually work up to eating solids.   __ Do not have a Barium Study or Enema for one week.    Your physician recommends the additional following instructions:    -You have a contact number available for emergencies. The signs and symptoms of potential delayed complications were discussed with you. You may return to normal activities tomorrow.  -Resume your previous diet.  -Continue your present medications.   -We are waiting for your pathology results.  -Your physician has recommended a repeat colonoscopy (date to be determined after pending pathology results are reviewed) for surveillance based on pathology results.  -The findings and recommendations have been discussed with you.  -The findings and recommendations were discussed with your family.  - Please see Medication Reconciliation Form for new medication/medications prescribed.       If you experience any problems or have any questions following discharge from the GI Lab, please call:  Before 5p.m.  [phone]  After 5p.m.    [phone]    Nurse Signature                                                                        Date___________________                                                                            Patient/Responsible Party Signature                                        Date___________________High Fiber Diet    About this topic  Dietary fiber helps many illnesses. It can help you if you cannot have a bowel movement or if you have loose stools. Fiber can also lower your risk of diabetes and heart disease. Fiber can help  with weight loss by helping you feel dietrich after meals.  You can find fiber in fruits, vegetables, nuts and seeds, whole grains, and legumes. The fiber is the part of the plant food that your body cannot break down and absorb. It passes through your stomach, small bowel, colon, and out your body.  There are two kinds of fiber: Insoluble and soluble fiber. Insoluble fiber helps you pass foods through your digestive system. Insoluble fiber can help you with hard stools. Soluble fiber draws water in and turns it into a gel-like form making digestion slow down. Both are important.    What will the results be?  A high fiber diet can help you with bowel problems like stools that are too hard or too loose. It can also help prevent hemorrhoids and other colon problems. A high fiber diet can also help control your weight and lower blood sugar and cholesterol levels.  What changes to diet are needed?  The amount of fiber you need is based on your age, gender, and health.  Try to get 20 to 35 grams of fiber in your diet each day. Most people in the [place] only eat 15 grams of fiber daily.  Drink at least 8 cups (1920 mL) of fluid each day.  When is this diet used?  Your doctor may talk with you about this diet if you have belly problems.  Who should use this diet?  Older children, young people, and adults can have this diet.  Who should not use this diet?  Some people should not use this diet. Check with your doctor if you have:  Diverticulitis  Active Crohn's disease  Ulcerative colitis  Bowel inflammation  Certain types of GI surgery  Talk to your child's doctor before starting your child on a high fiber diet.  What foods are good to eat?  To get the most from fiber in your diet, eat a wide variety of high fiber foods. Some examples are:  Vegetables like:  Spinach  Peas  Artichoke  Sweet potatoes with skin  Broccoli  Fruits like:  Raspberries  Blueberries  Blackberries  Apples with skin  Dried fruits  Grains like:  Oat  bran  Barley  Whole wheat products  Wheat bran  Dried beans and nuts like:  Sunflower seeds  Almonds  Black beans  Chickpeas  What problems could happen?  Sudden increase of fiber intake can lead to gas, pain, fullness in your belly, and loose stools. Increase fiber gradually while drinking plenty of fluids.  Do not eat too much fiber. Your body will not take in vitamins and minerals as well if you eat too much fiber.  When do I need to call the doctor?  Health problem is not better or you are feeling worse.  Helpful tips  Start slow as you add more fiber to your diet. This may help prevent gas or cramps.  Try to eat the same amount of fiber each day. Aim to get your fiber from nutritious foods. Supplements do not offer the same benefits as food.  Read food labels with care to learn how much fiber is in the food you are eating.  If possible, do not peel fruits or vegetables before you eat them. Eating the peel gives you more fiber.  Where can I learn more?  Eat Right  https://www.eatright.org/food/vitamins-and-supplements/types-of-vitamins-and-nutrients/easy-ways-to-boost-fiber-in-your-daily-diet  Last Reviewed Date  2021-09-23

## 2024-11-13 NOTE — ANESTHESIA POSTPROCEDURE EVALUATION
Patient: Fer Kasper    Procedure Summary       Date: 11/13/24 Room / Location: [facility]    Anesthesia Start: 1235 Anesthesia Stop: 1300    Procedure: COLONOSCOPY Diagnosis: Colon cancer screening    Scheduled Providers: Wilberto Johnson MD; Trent Garsia DO; Kiera Delvalle, CHANDRIKA; Andriy Bains, CHANDRIKA Responsible Provider: Trent Garsia DO    Anesthesia Type: MAC ASA Status: 2            Anesthesia Type: MAC    Vitals Value Taken Time   /65 11/13/24 1300   Temp 36.5 11/13/24 1300   Pulse 75 11/13/24 1300   Resp 18 11/13/24 1300   SpO2 100 11/13/24 1300       Anesthesia Post Evaluation    Patient location during evaluation: bedside  Patient participation: complete - patient participated  Level of consciousness: awake and alert  Pain score: 0  Pain management: adequate  Airway patency: patent  Cardiovascular status: acceptable and stable  Respiratory status: acceptable and room air  Hydration status: acceptable  Postoperative Nausea and Vomiting: none      No notable events documented.

## 2024-11-13 NOTE — Clinical Note
Huddle and Timeout completed together with team. Patient wristband and VANI information verified.  Anesthesia safety check completed

## 2024-11-13 NOTE — ANESTHESIA PREPROCEDURE EVALUATION
Patient: Fer Kasper    Procedure Information       Date/Time: 11/13/24 1130    Scheduled providers: Wilberto Johnson MD; Trent Garsia DO; Kiera Delvalle, CHANDRIKA; Andriy Bains RN    Procedure: COLONOSCOPY    Location: [facility]            Relevant Problems   Cardiac   (+) Hypercholesterolemia   (+) Hypertension      GI   (+) GERD (gastroesophageal reflux disease)       Clinical information reviewed:                   NPO Detail:  No data recorded     Physical Exam    Airway  Mallampati: II     Cardiovascular   Rhythm: regular  Rate: normal     Dental    Pulmonary - normal exam     Abdominal - normal exam             Anesthesia Plan    History of general anesthesia?: yes  History of complications of general anesthesia?: no    ASA 2     MAC     intravenous induction   Postoperative administration of opioids is intended.  Anesthetic plan and risks discussed with patient.

## 2025-08-13 ENCOUNTER — APPOINTMENT (OUTPATIENT)
Dept: PRIMARY CARE | Facility: CLINIC | Age: 70
End: 2025-08-13
Payer: MEDICARE

## 2025-08-13 VITALS
SYSTOLIC BLOOD PRESSURE: 134 MMHG | HEIGHT: 71 IN | WEIGHT: 181 LBS | TEMPERATURE: 98.2 F | DIASTOLIC BLOOD PRESSURE: 76 MMHG | HEART RATE: 80 BPM | OXYGEN SATURATION: 96 % | BODY MASS INDEX: 25.34 KG/M2

## 2025-08-13 DIAGNOSIS — I10 PRIMARY HYPERTENSION: Primary | ICD-10-CM

## 2025-08-13 DIAGNOSIS — Z00.00 MEDICARE ANNUAL WELLNESS VISIT, INITIAL: ICD-10-CM

## 2025-08-13 DIAGNOSIS — M75.110 NONTRAUMATIC INCOMPLETE TEAR OF ROTATOR CUFF, UNSPECIFIED LATERALITY: ICD-10-CM

## 2025-08-13 DIAGNOSIS — Z12.5 SCREENING FOR PROSTATE CANCER: ICD-10-CM

## 2025-08-13 DIAGNOSIS — E78.00 HYPERCHOLESTEROLEMIA: ICD-10-CM

## 2025-08-13 DIAGNOSIS — K21.9 GASTROESOPHAGEAL REFLUX DISEASE WITHOUT ESOPHAGITIS: ICD-10-CM

## 2025-08-13 LAB
ALBUMIN SERPL-MCNC: 4.9 G/DL (ref 3.6–5.1)
ALP SERPL-CCNC: 66 U/L (ref 35–144)
ALT SERPL-CCNC: 25 U/L (ref 9–46)
ANION GAP SERPL CALCULATED.4IONS-SCNC: 10 MMOL/L (CALC) (ref 7–17)
AST SERPL-CCNC: 34 U/L (ref 10–35)
BILIRUB SERPL-MCNC: 1.1 MG/DL (ref 0.2–1.2)
BUN SERPL-MCNC: 26 MG/DL (ref 7–25)
CALCIUM SERPL-MCNC: 9.6 MG/DL (ref 8.6–10.3)
CHLORIDE SERPL-SCNC: 101 MMOL/L (ref 98–110)
CHOLEST SERPL-MCNC: 202 MG/DL
CHOLEST/HDLC SERPL: 1.8 (CALC)
CO2 SERPL-SCNC: 28 MMOL/L (ref 20–32)
CREAT SERPL-MCNC: 1.35 MG/DL (ref 0.7–1.28)
EGFRCR SERPLBLD CKD-EPI 2021: 56 ML/MIN/1.73M2
ERYTHROCYTE [DISTWIDTH] IN BLOOD BY AUTOMATED COUNT: 12.9 % (ref 11–15)
GLUCOSE SERPL-MCNC: 109 MG/DL (ref 65–99)
HCT VFR BLD AUTO: 43.7 % (ref 38.5–50)
HDLC SERPL-MCNC: 111 MG/DL
HGB BLD-MCNC: 14.2 G/DL (ref 13.2–17.1)
LDLC SERPL CALC-MCNC: 80 MG/DL (CALC)
MCH RBC QN AUTO: 31.8 PG (ref 27–33)
MCHC RBC AUTO-ENTMCNC: 32.5 G/DL (ref 32–36)
MCV RBC AUTO: 97.8 FL (ref 80–100)
NONHDLC SERPL-MCNC: 91 MG/DL (CALC)
PLATELET # BLD AUTO: 255 THOUSAND/UL (ref 140–400)
PMV BLD REES-ECKER: 9.4 FL (ref 7.5–12.5)
POTASSIUM SERPL-SCNC: 4.4 MMOL/L (ref 3.5–5.3)
PROT SERPL-MCNC: 7.8 G/DL (ref 6.1–8.1)
PSA SERPL-MCNC: 4.42 NG/ML
RBC # BLD AUTO: 4.47 MILLION/UL (ref 4.2–5.8)
SODIUM SERPL-SCNC: 139 MMOL/L (ref 135–146)
TRIGL SERPL-MCNC: 38 MG/DL
WBC # BLD AUTO: 5.2 THOUSAND/UL (ref 3.8–10.8)

## 2025-08-13 PROCEDURE — G0439 PPPS, SUBSEQ VISIT: HCPCS | Performed by: FAMILY MEDICINE

## 2025-08-13 PROCEDURE — 1170F FXNL STATUS ASSESSED: CPT | Performed by: FAMILY MEDICINE

## 2025-08-13 PROCEDURE — G0446 INTENS BEHAVE THER CARDIO DX: HCPCS | Performed by: FAMILY MEDICINE

## 2025-08-13 PROCEDURE — 99214 OFFICE O/P EST MOD 30 MIN: CPT | Performed by: FAMILY MEDICINE

## 2025-08-13 PROCEDURE — 3008F BODY MASS INDEX DOCD: CPT | Performed by: FAMILY MEDICINE

## 2025-08-13 PROCEDURE — 1126F AMNT PAIN NOTED NONE PRSNT: CPT | Performed by: FAMILY MEDICINE

## 2025-08-13 PROCEDURE — 1159F MED LIST DOCD IN RCRD: CPT | Performed by: FAMILY MEDICINE

## 2025-08-13 PROCEDURE — 1124F ACP DISCUSS-NO DSCNMKR DOCD: CPT | Performed by: FAMILY MEDICINE

## 2025-08-13 PROCEDURE — 3075F SYST BP GE 130 - 139MM HG: CPT | Performed by: FAMILY MEDICINE

## 2025-08-13 PROCEDURE — 3078F DIAST BP <80 MM HG: CPT | Performed by: FAMILY MEDICINE

## 2025-08-13 PROCEDURE — 1160F RVW MEDS BY RX/DR IN RCRD: CPT | Performed by: FAMILY MEDICINE

## 2025-08-13 RX ORDER — ROSUVASTATIN CALCIUM 20 MG/1
20 TABLET, COATED ORAL DAILY
Qty: 90 TABLET | Refills: 3 | Status: SHIPPED | OUTPATIENT
Start: 2025-08-13 | End: 2026-08-13

## 2025-08-13 ASSESSMENT — PATIENT HEALTH QUESTIONNAIRE - PHQ9
2. FEELING DOWN, DEPRESSED OR HOPELESS: NOT AT ALL
SUM OF ALL RESPONSES TO PHQ9 QUESTIONS 1 AND 2: 0
1. LITTLE INTEREST OR PLEASURE IN DOING THINGS: NOT AT ALL

## 2025-08-13 ASSESSMENT — PAIN SCALES - GENERAL: PAINLEVEL_OUTOF10: 0-NO PAIN

## 2025-08-13 ASSESSMENT — ACTIVITIES OF DAILY LIVING (ADL)
DOING_HOUSEWORK: INDEPENDENT
DRESSING: INDEPENDENT
BATHING: INDEPENDENT
TAKING_MEDICATION: INDEPENDENT
GROCERY_SHOPPING: INDEPENDENT
MANAGING_FINANCES: INDEPENDENT

## 2025-08-19 DIAGNOSIS — R97.20 ELEVATED PSA: Primary | ICD-10-CM

## 2026-08-19 ENCOUNTER — APPOINTMENT (OUTPATIENT)
Dept: PRIMARY CARE | Facility: CLINIC | Age: 71
End: 2026-08-19
Payer: MEDICARE

## (undated) DEVICE — RESERVOIR, WOUND, W/TROCAR, PVC, MEDIUM, 400CC, DAVOL, 1/8 IN, 10FR

## (undated) DEVICE — ADHESIVE, SKIN, DERMABOND ADVANCED, 15CM, PEN-STYLE

## (undated) DEVICE — TIP, SUCTION, FRAZIER, W/CONTROL VENT, 12 FR

## (undated) DEVICE — INTERPULSE HANDPIECE SET, W/RETRACTABLE COAXIAL FAN SPRAY TIP & SUCTION TUBE

## (undated) DEVICE — SUTURE, VICRYL, 0, 36 IN, CT-1, UNDYED

## (undated) DEVICE — MARKER, SURGICAL, SKIN, REG TIP, W/ RULER & LABELS

## (undated) DEVICE — GLOVE, SURGICAL, PROTEXIS PI BLUE W/NEUTHERA, 8.0, PF, LF

## (undated) DEVICE — FACE SHIELD, OPTI-COM

## (undated) DEVICE — SOLUTION, IRRIGATION, X RX SODIUM CHL 0.9%, 1000ML BTL

## (undated) DEVICE — SOLUTION, IRRIGATION, USP, SODIUM CHLORIDE 0.9%, 3000 ML, BAG

## (undated) DEVICE — CUFF, TOURNIQUET, 30 X 4, DUAL PORT/SNGL BLADDER, DISP, LF

## (undated) DEVICE — Device

## (undated) DEVICE — SUTURE, ETHIBOND, P2, V-37, 30 IN, GREEN

## (undated) DEVICE — SOLUTION, POVIDONE IODINE 10%, 0.75 OZ, NS

## (undated) DEVICE — GLOVE, SURGICAL, PROTEXIS NEOPRENE, 7.5, PF, LF

## (undated) DEVICE — DRESSING, MEPILEX BORDER, POST-OP AG, 4 X 10 IN

## (undated) DEVICE — CEMENT, MIXEVAC III, 10S BOWL, KNEES

## (undated) DEVICE — SUTURE, VICRYL, 2-0, 36 IN, CT-1, UNDYED

## (undated) DEVICE — SUTURE, MONOCRYL, 4-0, 27 IN, PS-2, UNDYED

## (undated) DEVICE — CATHETER TRAY, URETHRAL, FOLEY, 16 FR, SILICONE

## (undated) DEVICE — STOCKINETTE, TUBULAR, BIAS CUT, 1 PLY, 4 IN X 50 YD, COTTON, NS

## (undated) DEVICE — DRESSING, WOUND, PRIMAPORE, 6 X 3 1/8